# Patient Record
Sex: MALE | Race: BLACK OR AFRICAN AMERICAN | Employment: UNEMPLOYED | URBAN - METROPOLITAN AREA
[De-identification: names, ages, dates, MRNs, and addresses within clinical notes are randomized per-mention and may not be internally consistent; named-entity substitution may affect disease eponyms.]

---

## 2017-05-22 PROBLEM — N18.30 CKD (CHRONIC KIDNEY DISEASE) STAGE 3, GFR 30-59 ML/MIN (HCC): Chronic | Status: ACTIVE | Noted: 2017-05-22

## 2017-05-22 PROBLEM — I48.0 PAROXYSMAL ATRIAL FIBRILLATION (HCC): Chronic | Status: ACTIVE | Noted: 2017-05-22

## 2017-05-22 PROBLEM — N17.9 ACUTE KIDNEY INJURY (HCC): Status: ACTIVE | Noted: 2017-05-22

## 2017-05-22 PROBLEM — E87.0 HYPERNATREMIA: Status: ACTIVE | Noted: 2017-05-22

## 2017-11-08 PROBLEM — N17.9 ACUTE RENAL FAILURE SUPERIMPOSED ON CHRONIC KIDNEY DISEASE, ON CHRONIC DIALYSIS (HCC): Chronic | Status: ACTIVE | Noted: 2017-11-08

## 2017-11-08 PROBLEM — N18.6 END STAGE RENAL DISEASE (HCC): Chronic | Status: ACTIVE | Noted: 2017-11-08

## 2017-11-08 PROBLEM — Z99.2 ACUTE RENAL FAILURE SUPERIMPOSED ON CHRONIC KIDNEY DISEASE, ON CHRONIC DIALYSIS (HCC): Chronic | Status: ACTIVE | Noted: 2017-11-08

## 2017-11-08 PROBLEM — N18.9 ACUTE RENAL FAILURE SUPERIMPOSED ON CHRONIC KIDNEY DISEASE, ON CHRONIC DIALYSIS (HCC): Chronic | Status: ACTIVE | Noted: 2017-11-08

## 2017-11-27 PROBLEM — N18.6 END STAGE RENAL DISEASE (HCC): Chronic | Status: ACTIVE | Noted: 2017-11-27

## 2018-01-18 PROBLEM — N18.6 END STAGE RENAL DISEASE (HCC): Chronic | Status: ACTIVE | Noted: 2018-01-18

## 2018-01-31 PROBLEM — N18.6 END STAGE RENAL DISEASE (HCC): Chronic | Status: ACTIVE | Noted: 2018-01-31

## 2018-04-12 ENCOUNTER — HOSPITAL ENCOUNTER (OUTPATIENT)
Age: 83
Discharge: HOME | End: 2018-04-14
Payer: MEDICARE

## 2018-04-12 LAB — POTASSIUM SERPL-SCNC: 5.3 MMOL/L (ref 3.5–5)

## 2018-04-12 PROCEDURE — 84132 ASSAY OF SERUM POTASSIUM: CPT

## 2018-04-18 ENCOUNTER — OFFICE VISIT (OUTPATIENT)
Dept: VASCULAR SURGERY | Age: 83
End: 2018-04-18
Payer: MEDICARE

## 2018-04-18 VITALS — SYSTOLIC BLOOD PRESSURE: 115 MMHG | DIASTOLIC BLOOD PRESSURE: 68 MMHG | HEART RATE: 73 BPM

## 2018-04-18 DIAGNOSIS — N18.6 END STAGE RENAL DISEASE (HCC): Chronic | ICD-10-CM

## 2018-04-18 PROCEDURE — 99213 OFFICE O/P EST LOW 20 MIN: CPT | Performed by: SURGERY

## 2018-04-20 ENCOUNTER — TELEPHONE (OUTPATIENT)
Dept: VASCULAR SURGERY | Age: 83
End: 2018-04-20

## 2018-04-23 ENCOUNTER — HOSPITAL ENCOUNTER (OUTPATIENT)
Dept: CARDIAC CATH/INVASIVE PROCEDURES | Age: 83
Discharge: HOME | End: 2018-04-23
Payer: MEDICARE

## 2018-04-23 PROCEDURE — 36901 INTRO CATH DIALYSIS CIRCUIT: CPT | Performed by: SURGERY

## 2018-04-23 PROCEDURE — 2709999900 HC NON-CHARGEABLE SUPPLY

## 2018-04-23 PROCEDURE — 2500000003 HC RX 250 WO HCPCS

## 2018-04-23 PROCEDURE — C1894 INTRO/SHEATH, NON-LASER: HCPCS

## 2018-04-23 RX ORDER — SENNOSIDES 8.6 MG
650 CAPSULE ORAL EVERY 8 HOURS PRN
COMMUNITY

## 2018-07-14 ENCOUNTER — HOSPITAL ENCOUNTER (OUTPATIENT)
Age: 83
Discharge: HOME | End: 2018-07-16
Payer: MEDICARE

## 2018-07-14 LAB — POTASSIUM SERPL-SCNC: 4.3 MMOL/L (ref 3.5–5)

## 2018-07-14 PROCEDURE — 84132 ASSAY OF SERUM POTASSIUM: CPT

## 2018-10-01 ENCOUNTER — HOSPITAL ENCOUNTER (INPATIENT)
Age: 83
LOS: 9 days | Discharge: NURSING, REHAB OR LONG-TERM CARE | DRG: 871 | End: 2018-10-10
Attending: EMERGENCY MEDICINE | Admitting: INTERNAL MEDICINE
Payer: MEDICARE

## 2018-10-01 ENCOUNTER — APPOINTMENT (OUTPATIENT)
Dept: GENERAL RADIOLOGY | Age: 83
DRG: 871 | End: 2018-10-01
Payer: MEDICARE

## 2018-10-01 DIAGNOSIS — N18.6 ESRD ON DIALYSIS (HCC): ICD-10-CM

## 2018-10-01 DIAGNOSIS — Z99.2 ESRD ON DIALYSIS (HCC): ICD-10-CM

## 2018-10-01 DIAGNOSIS — J18.9 HCAP (HEALTHCARE-ASSOCIATED PNEUMONIA): ICD-10-CM

## 2018-10-01 DIAGNOSIS — R65.21 SEPTIC SHOCK (HCC): Primary | ICD-10-CM

## 2018-10-01 DIAGNOSIS — A41.9 SEPTIC SHOCK (HCC): Primary | ICD-10-CM

## 2018-10-01 DIAGNOSIS — R79.1 SUPRATHERAPEUTIC INR: ICD-10-CM

## 2018-10-01 PROBLEM — D68.32 WARFARIN-INDUCED COAGULOPATHY (HCC): Status: ACTIVE | Noted: 2018-10-01

## 2018-10-01 PROBLEM — T45.515A WARFARIN-INDUCED COAGULOPATHY (HCC): Status: ACTIVE | Noted: 2018-10-01

## 2018-10-01 PROBLEM — J18.1 LOBAR PNEUMONIA (HCC): Status: ACTIVE | Noted: 2018-10-01

## 2018-10-01 LAB
ABO/RH: NORMAL
ALBUMIN SERPL-MCNC: 2.7 G/DL (ref 3.5–5.2)
ALBUMIN SERPL-MCNC: 2.7 G/DL (ref 3.5–5.2)
ALP BLD-CCNC: 50 U/L (ref 40–129)
ALP BLD-CCNC: 51 U/L (ref 40–129)
ALT SERPL-CCNC: 5 U/L (ref 0–40)
ALT SERPL-CCNC: 5 U/L (ref 0–40)
ANION GAP SERPL CALCULATED.3IONS-SCNC: 24 MMOL/L (ref 7–16)
ANION GAP SERPL CALCULATED.3IONS-SCNC: 24 MMOL/L (ref 7–16)
ANISOCYTOSIS: ABNORMAL
ANTIBODY SCREEN: NORMAL
APTT: 155.5 SEC (ref 24.5–35.1)
AST SERPL-CCNC: 13 U/L (ref 0–39)
AST SERPL-CCNC: 13 U/L (ref 0–39)
BASOPHILS ABSOLUTE: 0 E9/L (ref 0–0.2)
BASOPHILS RELATIVE PERCENT: 0 % (ref 0–2)
BILIRUB SERPL-MCNC: 1.1 MG/DL (ref 0–1.2)
BILIRUB SERPL-MCNC: 1.3 MG/DL (ref 0–1.2)
BUN BLDV-MCNC: 36 MG/DL (ref 8–23)
BUN BLDV-MCNC: 38 MG/DL (ref 8–23)
CALCIUM SERPL-MCNC: 8.3 MG/DL (ref 8.6–10.2)
CALCIUM SERPL-MCNC: 8.4 MG/DL (ref 8.6–10.2)
CHLORIDE BLD-SCNC: 96 MMOL/L (ref 98–107)
CHLORIDE BLD-SCNC: 96 MMOL/L (ref 98–107)
CO2: 20 MMOL/L (ref 22–29)
CO2: 22 MMOL/L (ref 22–29)
CREAT SERPL-MCNC: 6.6 MG/DL (ref 0.7–1.2)
CREAT SERPL-MCNC: 6.8 MG/DL (ref 0.7–1.2)
EKG ATRIAL RATE: 34 BPM
EKG Q-T INTERVAL: 494 MS
EKG QRS DURATION: 120 MS
EKG QTC CALCULATION (BAZETT): 533 MS
EKG R AXIS: 9 DEGREES
EKG T AXIS: 37 DEGREES
EKG VENTRICULAR RATE: 70 BPM
EOSINOPHILS ABSOLUTE: 0.6 E9/L (ref 0.05–0.5)
EOSINOPHILS RELATIVE PERCENT: 3 % (ref 0–6)
FILM ARRAY ADENOVIRUS: NORMAL
FILM ARRAY BORDETELLA PERTUSSIS: NORMAL
FILM ARRAY CHLAMYDOPHILIA PNEUMONIAE: NORMAL
FILM ARRAY CORONAVIRUS 229E: NORMAL
FILM ARRAY CORONAVIRUS HKU1: NORMAL
FILM ARRAY CORONAVIRUS NL63: NORMAL
FILM ARRAY CORONAVIRUS OC43: NORMAL
FILM ARRAY INFLUENZA A VIRUS 09H1: NORMAL
FILM ARRAY INFLUENZA A VIRUS H1: NORMAL
FILM ARRAY INFLUENZA A VIRUS H3: NORMAL
FILM ARRAY INFLUENZA A VIRUS: NORMAL
FILM ARRAY INFLUENZA B: NORMAL
FILM ARRAY METAPNEUMOVIRUS: NORMAL
FILM ARRAY MYCOPLASMA PNEUMONIAE: NORMAL
FILM ARRAY PARAINFLUENZA VIRUS 1: NORMAL
FILM ARRAY PARAINFLUENZA VIRUS 2: NORMAL
FILM ARRAY PARAINFLUENZA VIRUS 3: NORMAL
FILM ARRAY PARAINFLUENZA VIRUS 4: NORMAL
FILM ARRAY RESPIRATORY SYNCITIAL VIRUS: NORMAL
FILM ARRAY RHINOVIRUS/ENTEROVIRUS: NORMAL
GFR AFRICAN AMERICAN: 10
GFR AFRICAN AMERICAN: 9
GFR NON-AFRICAN AMERICAN: 10 ML/MIN/1.73
GFR NON-AFRICAN AMERICAN: 9 ML/MIN/1.73
GLUCOSE BLD-MCNC: 117 MG/DL (ref 74–109)
GLUCOSE BLD-MCNC: 161 MG/DL (ref 74–109)
HCT VFR BLD CALC: 23.5 % (ref 37–54)
HCT VFR BLD CALC: 24.2 % (ref 37–54)
HEMOGLOBIN: 7.1 G/DL (ref 12.5–16.5)
HEMOGLOBIN: 7.4 G/DL (ref 12.5–16.5)
HYPOCHROMIA: ABNORMAL
INR BLD: 5.4
INR BLD: ABNORMAL
LACTIC ACID: 1.7 MMOL/L (ref 0.5–2.2)
LYMPHOCYTES ABSOLUTE: 1.81 E9/L (ref 1.5–4)
LYMPHOCYTES RELATIVE PERCENT: 9 % (ref 20–42)
MAGNESIUM: 1.9 MG/DL (ref 1.6–2.6)
MAGNESIUM: 2 MG/DL (ref 1.6–2.6)
MCH RBC QN AUTO: 30 PG (ref 26–35)
MCHC RBC AUTO-ENTMCNC: 30.6 % (ref 32–34.5)
MCV RBC AUTO: 98 FL (ref 80–99.9)
METAMYELOCYTES RELATIVE PERCENT: 2 % (ref 0–1)
MONOCYTES ABSOLUTE: 1 E9/L (ref 0.1–0.95)
MONOCYTES RELATIVE PERCENT: 5 % (ref 2–12)
MYELOCYTE PERCENT: 1 % (ref 0–0)
NEUTROPHILS ABSOLUTE: 16.68 E9/L (ref 1.8–7.3)
NEUTROPHILS RELATIVE PERCENT: 80 % (ref 43–80)
OVALOCYTES: ABNORMAL
PDW BLD-RTO: 23.9 FL (ref 11.5–15)
PHOSPHORUS: 5.1 MG/DL (ref 2.5–4.5)
PHOSPHORUS: 5.2 MG/DL (ref 2.5–4.5)
PLATELET # BLD: 373 E9/L (ref 130–450)
PMV BLD AUTO: 11.5 FL (ref 7–12)
POIKILOCYTES: ABNORMAL
POLYCHROMASIA: ABNORMAL
POTASSIUM SERPL-SCNC: 3.5 MMOL/L (ref 3.5–5)
POTASSIUM SERPL-SCNC: 3.8 MMOL/L (ref 3.5–5)
PRO-BNP: ABNORMAL PG/ML (ref 0–450)
PROCALCITONIN: 2.88 NG/ML (ref 0–0.08)
PROTHROMBIN TIME: 60.7 SEC (ref 9.3–12.4)
PROTHROMBIN TIME: >120 SEC (ref 9.3–12.4)
RBC # BLD: 2.47 E12/L (ref 3.8–5.8)
SODIUM BLD-SCNC: 140 MMOL/L (ref 132–146)
SODIUM BLD-SCNC: 142 MMOL/L (ref 132–146)
TEAR DROP CELLS: ABNORMAL
TOTAL PROTEIN: 6.5 G/DL (ref 6.4–8.3)
TOTAL PROTEIN: 6.5 G/DL (ref 6.4–8.3)
TROPONIN: 0.5 NG/ML (ref 0–0.03)
TROPONIN: 0.56 NG/ML (ref 0–0.03)
WBC # BLD: 20.1 E9/L (ref 4.5–11.5)

## 2018-10-01 PROCEDURE — 2000000000 HC ICU R&B

## 2018-10-01 PROCEDURE — 84100 ASSAY OF PHOSPHORUS: CPT

## 2018-10-01 PROCEDURE — 71045 X-RAY EXAM CHEST 1 VIEW: CPT

## 2018-10-01 PROCEDURE — 87798 DETECT AGENT NOS DNA AMP: CPT

## 2018-10-01 PROCEDURE — 85018 HEMOGLOBIN: CPT

## 2018-10-01 PROCEDURE — 80053 COMPREHEN METABOLIC PANEL: CPT

## 2018-10-01 PROCEDURE — 83735 ASSAY OF MAGNESIUM: CPT

## 2018-10-01 PROCEDURE — 86901 BLOOD TYPING SEROLOGIC RH(D): CPT

## 2018-10-01 PROCEDURE — 85014 HEMATOCRIT: CPT

## 2018-10-01 PROCEDURE — 36569 INSJ PICC 5 YR+ W/O IMAGING: CPT

## 2018-10-01 PROCEDURE — 87040 BLOOD CULTURE FOR BACTERIA: CPT

## 2018-10-01 PROCEDURE — 87581 M.PNEUMON DNA AMP PROBE: CPT

## 2018-10-01 PROCEDURE — 6360000002 HC RX W HCPCS: Performed by: EMERGENCY MEDICINE

## 2018-10-01 PROCEDURE — 87633 RESP VIRUS 12-25 TARGETS: CPT

## 2018-10-01 PROCEDURE — 99291 CRITICAL CARE FIRST HOUR: CPT

## 2018-10-01 PROCEDURE — 83880 ASSAY OF NATRIURETIC PEPTIDE: CPT

## 2018-10-01 PROCEDURE — 2580000003 HC RX 258: Performed by: INTERNAL MEDICINE

## 2018-10-01 PROCEDURE — 2580000003 HC RX 258: Performed by: EMERGENCY MEDICINE

## 2018-10-01 PROCEDURE — 36592 COLLECT BLOOD FROM PICC: CPT

## 2018-10-01 PROCEDURE — 6370000000 HC RX 637 (ALT 250 FOR IP): Performed by: INTERNAL MEDICINE

## 2018-10-01 PROCEDURE — 36556 INSERT NON-TUNNEL CV CATH: CPT

## 2018-10-01 PROCEDURE — 96365 THER/PROPH/DIAG IV INF INIT: CPT

## 2018-10-01 PROCEDURE — 93005 ELECTROCARDIOGRAM TRACING: CPT | Performed by: EMERGENCY MEDICINE

## 2018-10-01 PROCEDURE — 86900 BLOOD TYPING SEROLOGIC ABO: CPT

## 2018-10-01 PROCEDURE — 83605 ASSAY OF LACTIC ACID: CPT

## 2018-10-01 PROCEDURE — 85730 THROMBOPLASTIN TIME PARTIAL: CPT

## 2018-10-01 PROCEDURE — 36415 COLL VENOUS BLD VENIPUNCTURE: CPT

## 2018-10-01 PROCEDURE — 85610 PROTHROMBIN TIME: CPT

## 2018-10-01 PROCEDURE — 87486 CHLMYD PNEUM DNA AMP PROBE: CPT

## 2018-10-01 PROCEDURE — 51701 INSERT BLADDER CATHETER: CPT

## 2018-10-01 PROCEDURE — 85025 COMPLETE CBC W/AUTO DIFF WBC: CPT

## 2018-10-01 PROCEDURE — 86850 RBC ANTIBODY SCREEN: CPT

## 2018-10-01 PROCEDURE — 2500000003 HC RX 250 WO HCPCS: Performed by: EMERGENCY MEDICINE

## 2018-10-01 PROCEDURE — 93005 ELECTROCARDIOGRAM TRACING: CPT | Performed by: INTERNAL MEDICINE

## 2018-10-01 PROCEDURE — 96368 THER/DIAG CONCURRENT INF: CPT

## 2018-10-01 PROCEDURE — 94640 AIRWAY INHALATION TREATMENT: CPT

## 2018-10-01 PROCEDURE — 06HM33Z INSERTION OF INFUSION DEVICE INTO RIGHT FEMORAL VEIN, PERCUTANEOUS APPROACH: ICD-10-PCS | Performed by: EMERGENCY MEDICINE

## 2018-10-01 PROCEDURE — 86920 COMPATIBILITY TEST SPIN: CPT

## 2018-10-01 PROCEDURE — 84484 ASSAY OF TROPONIN QUANT: CPT

## 2018-10-01 PROCEDURE — 84145 PROCALCITONIN (PCT): CPT

## 2018-10-01 RX ORDER — ACETAMINOPHEN 325 MG/1
325 TABLET ORAL EVERY 4 HOURS PRN
Status: DISCONTINUED | OUTPATIENT
Start: 2018-10-01 | End: 2018-10-10 | Stop reason: HOSPADM

## 2018-10-01 RX ORDER — ATORVASTATIN CALCIUM 20 MG/1
20 TABLET, FILM COATED ORAL DAILY
Status: DISCONTINUED | OUTPATIENT
Start: 2018-10-01 | End: 2018-10-10 | Stop reason: HOSPADM

## 2018-10-01 RX ORDER — 0.9 % SODIUM CHLORIDE 0.9 %
500 INTRAVENOUS SOLUTION INTRAVENOUS ONCE
Status: COMPLETED | OUTPATIENT
Start: 2018-10-01 | End: 2018-10-01

## 2018-10-01 RX ORDER — MIDODRINE HYDROCHLORIDE 5 MG/1
5 TABLET ORAL 3 TIMES DAILY
Status: DISCONTINUED | OUTPATIENT
Start: 2018-10-01 | End: 2018-10-02

## 2018-10-01 RX ORDER — 0.9 % SODIUM CHLORIDE 0.9 %
30 INTRAVENOUS SOLUTION INTRAVENOUS ONCE
Status: COMPLETED | OUTPATIENT
Start: 2018-10-01 | End: 2018-10-01

## 2018-10-01 RX ORDER — ONDANSETRON 2 MG/ML
4 INJECTION INTRAMUSCULAR; INTRAVENOUS EVERY 6 HOURS PRN
Status: DISCONTINUED | OUTPATIENT
Start: 2018-10-01 | End: 2018-10-10 | Stop reason: HOSPADM

## 2018-10-01 RX ORDER — LEVOTHYROXINE SODIUM 0.05 MG/1
50 TABLET ORAL DAILY
Status: DISCONTINUED | OUTPATIENT
Start: 2018-10-02 | End: 2018-10-10 | Stop reason: HOSPADM

## 2018-10-01 RX ORDER — WARFARIN SODIUM 2.5 MG/1
2.5 TABLET ORAL DAILY
Status: ON HOLD | COMMUNITY
End: 2018-10-10 | Stop reason: HOSPADM

## 2018-10-01 RX ORDER — SKIN PROTECTANT 44 G/100G
OINTMENT TOPICAL 2 TIMES DAILY
COMMUNITY
End: 2018-12-10 | Stop reason: ALTCHOICE

## 2018-10-01 RX ORDER — SODIUM CHLORIDE 0.9 % (FLUSH) 0.9 %
10 SYRINGE (ML) INJECTION PRN
Status: DISCONTINUED | OUTPATIENT
Start: 2018-10-01 | End: 2018-10-10 | Stop reason: HOSPADM

## 2018-10-01 RX ORDER — ALBUTEROL SULFATE 2.5 MG/3ML
2.5 SOLUTION RESPIRATORY (INHALATION) EVERY 6 HOURS PRN
COMMUNITY

## 2018-10-01 RX ORDER — SODIUM CHLORIDE 0.9 % (FLUSH) 0.9 %
10 SYRINGE (ML) INJECTION EVERY 12 HOURS SCHEDULED
Status: DISCONTINUED | OUTPATIENT
Start: 2018-10-01 | End: 2018-10-10 | Stop reason: HOSPADM

## 2018-10-01 RX ORDER — IPRATROPIUM BROMIDE AND ALBUTEROL SULFATE 2.5; .5 MG/3ML; MG/3ML
1 SOLUTION RESPIRATORY (INHALATION) EVERY 4 HOURS
Status: DISCONTINUED | OUTPATIENT
Start: 2018-10-01 | End: 2018-10-10 | Stop reason: HOSPADM

## 2018-10-01 RX ORDER — SODIUM CHLORIDE 9 MG/ML
INJECTION, SOLUTION INTRAVENOUS CONTINUOUS
Status: DISCONTINUED | OUTPATIENT
Start: 2018-10-01 | End: 2018-10-01

## 2018-10-01 RX ADMIN — IPRATROPIUM BROMIDE AND ALBUTEROL SULFATE 1 AMPULE: 2.5; .5 SOLUTION RESPIRATORY (INHALATION) at 20:43

## 2018-10-01 RX ADMIN — NOREPINEPHRINE BITARTRATE 2 MCG/MIN: 1 INJECTION INTRAVENOUS at 13:26

## 2018-10-01 RX ADMIN — VANCOMYCIN HYDROCHLORIDE 1.5 G: 10 INJECTION, POWDER, LYOPHILIZED, FOR SOLUTION INTRAVENOUS at 15:00

## 2018-10-01 RX ADMIN — ATORVASTATIN CALCIUM 20 MG: 20 TABLET, FILM COATED ORAL at 21:30

## 2018-10-01 RX ADMIN — PHYTONADIONE 10 MG: 10 INJECTION, EMULSION INTRAMUSCULAR; INTRAVENOUS; SUBCUTANEOUS at 15:20

## 2018-10-01 RX ADMIN — CEFEPIME HYDROCHLORIDE 2 G: 2 INJECTION, POWDER, FOR SOLUTION INTRAVENOUS at 13:12

## 2018-10-01 RX ADMIN — SODIUM CHLORIDE 2382 ML: 9 INJECTION, SOLUTION INTRAVENOUS at 13:02

## 2018-10-01 RX ADMIN — Medication 10 ML: at 21:28

## 2018-10-01 RX ADMIN — SODIUM CHLORIDE 500 ML: 9 INJECTION, SOLUTION INTRAVENOUS at 11:14

## 2018-10-01 RX ADMIN — IPRATROPIUM BROMIDE AND ALBUTEROL SULFATE 1 AMPULE: 2.5; .5 SOLUTION RESPIRATORY (INHALATION) at 17:21

## 2018-10-01 RX ADMIN — SODIUM CHLORIDE: 9 INJECTION, SOLUTION INTRAVENOUS at 16:31

## 2018-10-01 ASSESSMENT — PAIN DESCRIPTION - DESCRIPTORS: DESCRIPTORS: ACHING

## 2018-10-01 ASSESSMENT — PAIN DESCRIPTION - LOCATION: LOCATION: GENERALIZED

## 2018-10-01 ASSESSMENT — PAIN DESCRIPTION - PAIN TYPE: TYPE: ACUTE PAIN

## 2018-10-01 ASSESSMENT — PAIN SCALES - GENERAL: PAINLEVEL_OUTOF10: 10

## 2018-10-01 NOTE — CONSULTS
Laterality Date    CARDIAC CATHETERIZATION  2/26/13    DIALYSIS FISTULA CREATION Left 12/29/2017    Left arm AV fistula creation/Dr. Mendoza    ECHO COMPL W DOP COLOR FLOW  2/24/2013         OTHER SURGICAL HISTORY  04/23/2018    Left arm fistula by DR Mendoza    UPPER GASTROINTESTINAL ENDOSCOPY  4/14, 4/14/16, & 4/24/16    VENA CAVA FILTER PLACEMENT  8/14    d/t DVT       Medications Prior to Admission:    Prior to Admission medications    Medication Sig Start Date End Date Taking?  Authorizing Provider   albuterol (PROVENTIL) (2.5 MG/3ML) 0.083% nebulizer solution Take 2.5 mg by nebulization every 6 hours as needed for Shortness of Breath   Yes Historical Provider, MD   [facility]) OINT ointment Apply topically 2 times daily BLE   Yes Historical Provider, MD   warfarin (COUMADIN) 2.5 MG tablet Take 2.5 mg by mouth daily   Yes Historical Provider, MD   acetaminophen (ARTHRITIS PAIN RELIEF) 650 MG extended release tablet Take 650 mg by mouth every 8 hours as needed for Pain   Yes Historical Provider, MD   benzocaine (CEPACOL) 10 MG LOZG Take 1 lozenge by mouth every 4 hours as needed    Yes Historical Provider, MD   magnesium hydroxide (MILK OF MAGNESIA) 400 MG/5ML suspension Take 30 mLs by mouth daily as needed for Constipation    Yes Historical Provider, MD   allopurinol (ZYLOPRIM) 100 MG tablet Take 100 mg by mouth daily   Yes Historical Provider, MD   atorvastatin (LIPITOR) 20 MG tablet Take 20 mg by mouth daily   Yes Historical Provider, MD   loperamide (IMODIUM) 2 MG capsule Take 2 mg by mouth every 12 hours as needed for Diarrhea    Yes Historical Provider, MD   ondansetron (ZOFRAN) 4 MG tablet Take 4 mg by mouth every 6 hours as needed for Nausea or Vomiting   Yes Historical Provider, MD   aspirin 81 MG tablet Take 81 mg by mouth daily   Yes Historical Provider, MD   midodrine (PROAMATINE) 5 MG tablet Take 5 mg by mouth 3 times daily   Yes Historical Provider, MD   levothyroxine (SYNTHROID)

## 2018-10-02 ENCOUNTER — APPOINTMENT (OUTPATIENT)
Dept: GENERAL RADIOLOGY | Age: 83
DRG: 871 | End: 2018-10-02
Payer: MEDICARE

## 2018-10-02 LAB
ALBUMIN SERPL-MCNC: 2.8 G/DL (ref 3.5–5.2)
ALP BLD-CCNC: 48 U/L (ref 40–129)
ALT SERPL-CCNC: <5 U/L (ref 0–40)
ANION GAP SERPL CALCULATED.3IONS-SCNC: 23 MMOL/L (ref 7–16)
ANISOCYTOSIS: ABNORMAL
AST SERPL-CCNC: 13 U/L (ref 0–39)
BASOPHILIC STIPPLING: ABNORMAL
BASOPHILS ABSOLUTE: 0.08 E9/L (ref 0–0.2)
BASOPHILS RELATIVE PERCENT: 0.4 % (ref 0–2)
BILIRUB SERPL-MCNC: 1.1 MG/DL (ref 0–1.2)
BLOOD BANK DISPENSE STATUS: NORMAL
BLOOD BANK PRODUCT CODE: NORMAL
BPU ID: NORMAL
BUN BLDV-MCNC: 44 MG/DL (ref 8–23)
CALCIUM SERPL-MCNC: 8.2 MG/DL (ref 8.6–10.2)
CHLORIDE BLD-SCNC: 95 MMOL/L (ref 98–107)
CO2: 22 MMOL/L (ref 22–29)
CREAT SERPL-MCNC: 7.5 MG/DL (ref 0.7–1.2)
DESCRIPTION BLOOD BANK: NORMAL
EKG ATRIAL RATE: 81 BPM
EKG Q-T INTERVAL: 448 MS
EKG QRS DURATION: 122 MS
EKG QTC CALCULATION (BAZETT): 533 MS
EKG R AXIS: 8 DEGREES
EKG T AXIS: 60 DEGREES
EKG VENTRICULAR RATE: 85 BPM
EOSINOPHILS ABSOLUTE: 0.32 E9/L (ref 0.05–0.5)
EOSINOPHILS RELATIVE PERCENT: 1.6 % (ref 0–6)
GFR AFRICAN AMERICAN: 8
GFR NON-AFRICAN AMERICAN: 8 ML/MIN/1.73
GLUCOSE BLD-MCNC: 139 MG/DL (ref 74–109)
HCT VFR BLD CALC: 22.2 % (ref 37–54)
HCT VFR BLD CALC: 25.3 % (ref 37–54)
HCT VFR BLD CALC: 26.1 % (ref 37–54)
HEMOGLOBIN: 6.8 G/DL (ref 12.5–16.5)
HEMOGLOBIN: 7.8 G/DL (ref 12.5–16.5)
HEMOGLOBIN: 8.1 G/DL (ref 12.5–16.5)
HYPOCHROMIA: ABNORMAL
IMMATURE GRANULOCYTES #: 1.43 E9/L
IMMATURE GRANULOCYTES %: 7 % (ref 0–5)
INR BLD: 1.5
LYMPHOCYTES ABSOLUTE: 2.1 E9/L (ref 1.5–4)
LYMPHOCYTES RELATIVE PERCENT: 10.3 % (ref 20–42)
MAGNESIUM: 2 MG/DL (ref 1.6–2.6)
MCH RBC QN AUTO: 29.8 PG (ref 26–35)
MCHC RBC AUTO-ENTMCNC: 30.6 % (ref 32–34.5)
MCV RBC AUTO: 97.4 FL (ref 80–99.9)
MONOCYTES ABSOLUTE: 1.44 E9/L (ref 0.1–0.95)
MONOCYTES RELATIVE PERCENT: 7.1 % (ref 2–12)
NEUTROPHILS ABSOLUTE: 14.99 E9/L (ref 1.8–7.3)
NEUTROPHILS RELATIVE PERCENT: 73.6 % (ref 43–80)
OVALOCYTES: ABNORMAL
PDW BLD-RTO: 24.8 FL (ref 11.5–15)
PHOSPHORUS: 6.2 MG/DL (ref 2.5–4.5)
PLATELET # BLD: 379 E9/L (ref 130–450)
PMV BLD AUTO: 11.2 FL (ref 7–12)
POIKILOCYTES: ABNORMAL
POLYCHROMASIA: ABNORMAL
POTASSIUM SERPL-SCNC: 3.6 MMOL/L (ref 3.5–5)
PROTHROMBIN TIME: 17.7 SEC (ref 9.3–12.4)
RBC # BLD: 2.28 E12/L (ref 3.8–5.8)
SODIUM BLD-SCNC: 140 MMOL/L (ref 132–146)
TEAR DROP CELLS: ABNORMAL
TOTAL PROTEIN: 6.1 G/DL (ref 6.4–8.3)
WBC # BLD: 20.4 E9/L (ref 4.5–11.5)

## 2018-10-02 PROCEDURE — 36430 TRANSFUSION BLD/BLD COMPNT: CPT

## 2018-10-02 PROCEDURE — 85025 COMPLETE CBC W/AUTO DIFF WBC: CPT

## 2018-10-02 PROCEDURE — 80053 COMPREHEN METABOLIC PANEL: CPT

## 2018-10-02 PROCEDURE — 2000000000 HC ICU R&B

## 2018-10-02 PROCEDURE — 85018 HEMOGLOBIN: CPT

## 2018-10-02 PROCEDURE — 6360000002 HC RX W HCPCS: Performed by: STUDENT IN AN ORGANIZED HEALTH CARE EDUCATION/TRAINING PROGRAM

## 2018-10-02 PROCEDURE — 36592 COLLECT BLOOD FROM PICC: CPT

## 2018-10-02 PROCEDURE — 6360000002 HC RX W HCPCS: Performed by: INTERNAL MEDICINE

## 2018-10-02 PROCEDURE — 84100 ASSAY OF PHOSPHORUS: CPT

## 2018-10-02 PROCEDURE — 36415 COLL VENOUS BLD VENIPUNCTURE: CPT

## 2018-10-02 PROCEDURE — 6370000000 HC RX 637 (ALT 250 FOR IP): Performed by: INTERNAL MEDICINE

## 2018-10-02 PROCEDURE — 2500000003 HC RX 250 WO HCPCS

## 2018-10-02 PROCEDURE — P9016 RBC LEUKOCYTES REDUCED: HCPCS

## 2018-10-02 PROCEDURE — 36620 INSERTION CATHETER ARTERY: CPT

## 2018-10-02 PROCEDURE — 2500000003 HC RX 250 WO HCPCS: Performed by: EMERGENCY MEDICINE

## 2018-10-02 PROCEDURE — 93005 ELECTROCARDIOGRAM TRACING: CPT | Performed by: INTERNAL MEDICINE

## 2018-10-02 PROCEDURE — 2580000003 HC RX 258: Performed by: STUDENT IN AN ORGANIZED HEALTH CARE EDUCATION/TRAINING PROGRAM

## 2018-10-02 PROCEDURE — 83735 ASSAY OF MAGNESIUM: CPT

## 2018-10-02 PROCEDURE — 71045 X-RAY EXAM CHEST 1 VIEW: CPT

## 2018-10-02 PROCEDURE — 2580000003 HC RX 258: Performed by: INTERNAL MEDICINE

## 2018-10-02 PROCEDURE — 85610 PROTHROMBIN TIME: CPT

## 2018-10-02 PROCEDURE — 85014 HEMATOCRIT: CPT

## 2018-10-02 PROCEDURE — 94640 AIRWAY INHALATION TREATMENT: CPT

## 2018-10-02 PROCEDURE — 2700000000 HC OXYGEN THERAPY PER DAY

## 2018-10-02 PROCEDURE — 93010 ELECTROCARDIOGRAM REPORT: CPT | Performed by: INTERNAL MEDICINE

## 2018-10-02 RX ORDER — 0.9 % SODIUM CHLORIDE 0.9 %
250 INTRAVENOUS SOLUTION INTRAVENOUS ONCE
Status: COMPLETED | OUTPATIENT
Start: 2018-10-02 | End: 2018-10-02

## 2018-10-02 RX ORDER — MIDODRINE HYDROCHLORIDE 5 MG/1
10 TABLET ORAL 3 TIMES DAILY
Status: DISCONTINUED | OUTPATIENT
Start: 2018-10-02 | End: 2018-10-10 | Stop reason: HOSPADM

## 2018-10-02 RX ORDER — LIDOCAINE HYDROCHLORIDE 10 MG/ML
INJECTION, SOLUTION EPIDURAL; INFILTRATION; INTRACAUDAL; PERINEURAL
Status: COMPLETED
Start: 2018-10-02 | End: 2018-10-02

## 2018-10-02 RX ADMIN — IPRATROPIUM BROMIDE AND ALBUTEROL SULFATE 1 AMPULE: 2.5; .5 SOLUTION RESPIRATORY (INHALATION) at 21:05

## 2018-10-02 RX ADMIN — LIDOCAINE HYDROCHLORIDE 5 ML: 10 INJECTION, SOLUTION EPIDURAL; INFILTRATION; INTRACAUDAL; PERINEURAL at 21:52

## 2018-10-02 RX ADMIN — PHENYLEPHRINE HYDROCHLORIDE 185 MCG/MIN: 10 INJECTION INTRAVENOUS at 20:59

## 2018-10-02 RX ADMIN — PHENYLEPHRINE HYDROCHLORIDE 1 MCG/KG/MIN: 10 INJECTION INTRAVENOUS at 13:51

## 2018-10-02 RX ADMIN — SODIUM CHLORIDE 250 ML: 9 INJECTION, SOLUTION INTRAVENOUS at 09:25

## 2018-10-02 RX ADMIN — MIDODRINE HYDROCHLORIDE 10 MG: 5 TABLET ORAL at 18:34

## 2018-10-02 RX ADMIN — IPRATROPIUM BROMIDE AND ALBUTEROL SULFATE 1 AMPULE: 2.5; .5 SOLUTION RESPIRATORY (INHALATION) at 10:31

## 2018-10-02 RX ADMIN — Medication 10 ML: at 09:30

## 2018-10-02 RX ADMIN — ATORVASTATIN CALCIUM 20 MG: 20 TABLET, FILM COATED ORAL at 20:16

## 2018-10-02 RX ADMIN — Medication 8 MCG/MIN: at 08:59

## 2018-10-02 RX ADMIN — IPRATROPIUM BROMIDE AND ALBUTEROL SULFATE 1 AMPULE: 2.5; .5 SOLUTION RESPIRATORY (INHALATION) at 16:46

## 2018-10-02 RX ADMIN — PHENYLEPHRINE HYDROCHLORIDE 140 MCG/MIN: 10 INJECTION INTRAVENOUS at 15:45

## 2018-10-02 RX ADMIN — CEFEPIME HYDROCHLORIDE 1 G: 1 INJECTION, POWDER, FOR SOLUTION INTRAMUSCULAR; INTRAVENOUS at 13:13

## 2018-10-02 RX ADMIN — IPRATROPIUM BROMIDE AND ALBUTEROL SULFATE 1 AMPULE: 2.5; .5 SOLUTION RESPIRATORY (INHALATION) at 12:36

## 2018-10-02 RX ADMIN — Medication 10 ML: at 20:16

## 2018-10-02 RX ADMIN — MIDODRINE HYDROCHLORIDE 5 MG: 5 TABLET ORAL at 08:55

## 2018-10-02 RX ADMIN — PHENYLEPHRINE HYDROCHLORIDE 250 MCG/MIN: 10 INJECTION INTRAVENOUS at 23:56

## 2018-10-02 RX ADMIN — MIDODRINE HYDROCHLORIDE 5 MG: 5 TABLET ORAL at 13:31

## 2018-10-02 RX ADMIN — LEVOTHYROXINE SODIUM 50 MCG: 50 TABLET ORAL at 08:55

## 2018-10-02 ASSESSMENT — PAIN SCALES - GENERAL: PAINLEVEL_OUTOF10: 0

## 2018-10-02 NOTE — CONSULTS
[facility]  Consult Note        Date:   10/2/2018  Patient name:  Stephen Echeverria  Date of admission:  10/1/2018 10:23 AM  MRN:   [id number]  YOB: 1934    Reason for Consult:  HCAP    Consulting Physician:  Dr. Sherry Castañeda    CC:   Chief Complaint   Patient presents with    Dizziness     low BP at ECF/ 77/45 upon arrival       HISTORY OF PRESENT ILLNESS:                The patient is a 80 y.o.  male who was admitted to the hospital for AMS from NH. He had fever, productive cough with white sputum for 2 days, was put on levaquin (time and dose unknown). He also had A fib RVR on warfarin with a prolonged INR>10 on admission. Reports oozing from dialysis access site (Left arm AV fistula). Hb dropped to 6.8 and is receiving blood transfusion this morning. His procalcitonin 2.88, Pro-BNP 33,401, leukocytosis with WBC of 20.1. He received one dose of vancomycin and was on cefepime 1g Q24h. No fever, chest pain or diarrhea. Past Medical History:   has a past medical history of Anemia; Arthritis; Atrial fibrillation ([address] Calcaneal spur of both feet; Cardiomyopathy; Cardiomyopathy ([address] CHF (congestive heart failure) ([address] Chronic kidney disease; Combined systolic and diastolic heart failure ([address] Depression; Dialysis patient; DVT (deep venous thrombosis) ([address] Dysphagia; Failure to thrive (0-17); Gastrointestinal hemorrhage; Gout; Hemodialysis patient ([address] Hyperlipidemia; Hypertension; Hypotension of hemodialysis; Lobar pneumonia ([address] Peripheral vascular disease; Pulmonary hypertension ([address] 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 Past Surgical History:   has a past surgical history that includes ECHO Compl W Dop Color Flow (2/24/2013); Cardiac catheterization (2/26/13); Vena Cava Filter Placement (8/14); Upper gastrointestinal endoscopy (4/14, 4/14/16, & 4/24/16);  Dialysis fistula creation (Left, 12/29/2017); and other surgical history (04/23/2018). Home Medications:    Prior to Admission medications    Medication Sig Start Date End Date Taking? Authorizing Provider   albuterol (PROVENTIL) (2.5 MG/3ML) 0.083% nebulizer solution Take 2.5 mg by nebulization every 6 hours as needed for Shortness of Breath   Yes Historical Provider, MD   [facility] OINT ointment Apply topically 2 times daily BLE   Yes Historical Provider, MD   warfarin (COUMADIN) 2.5 MG tablet Take 2.5 mg by mouth daily   Yes Historical Provider, MD   acetaminophen (ARTHRITIS PAIN RELIEF) 650 MG extended release tablet Take 650 mg by mouth every 8 hours as needed for Pain   Yes Historical Provider, MD   benzocaine (CEPACOL) 10 MG LOZG Take 1 lozenge by mouth every 4 hours as needed    Yes Historical Provider, MD   magnesium hydroxide (MILK OF MAGNESIA) 400 MG/5ML suspension Take 30 mLs by mouth daily as needed for Constipation    Yes Historical Provider, MD   allopurinol (ZYLOPRIM) 100 MG tablet Take 100 mg by mouth daily   Yes Historical Provider, MD   atorvastatin (LIPITOR) 20 MG tablet Take 20 mg by mouth daily   Yes Historical Provider, MD   loperamide (IMODIUM) 2 MG capsule Take 2 mg by mouth every 12 hours as needed for Diarrhea    Yes Historical Provider, MD   ondansetron (ZOFRAN) 4 MG tablet Take 4 mg by mouth every 6 hours as needed for Nausea or Vomiting   Yes Historical Provider, MD   aspirin 81 MG tablet Take 81 mg by mouth daily   Yes Historical Provider, MD   midodrine (PROAMATINE) 5 MG tablet Take 5 mg by mouth 3 times daily   Yes Historical Provider, MD   levothyroxine (SYNTHROID) 50 MCG tablet Take 50 mcg by mouth Daily   Yes Historical Provider, MD   zinc oxide 20 % ointment Apply topically as needed for Dry Skin Apply topically as needed. Yes Historical Provider, MD       Allergies:  Patient has no known allergies. Social History:   reports that he has never smoked.  He has never used smokeless tobacco. He reports that he

## 2018-10-02 NOTE — CARE COORDINATION
Social Work/Discharge Planning:    Pt is from [facility]. TRAVIS spoke with Db Yun at [facility]. Db Yun stated that pt is intermediate care, a bed hold, able to return and will not need precert at discharge. Db Yun stated that pt will need PT/OT evals prior to d/c. TRAVIS spoke with pt brother Pricila, pt bother Pricila stated that he is pt POA and plan at discharge is for pt to return to [facility]. N-17 started, Ambulance form and envelope in pt soft chart. SW will follow.     Electronically signed by ERNESTO Jones on 10/2/2018 at 11:16 AM

## 2018-10-02 NOTE — PROGRESS NOTES
neosynephrine    Supra-therapeutic INR  - improving  - likely 2/2 drug interaction between Levaquin and Warfarin  - pt presented with INR >10 and left arm AVF bleeding  - INR today down to 1.5  - bleeding stopped  - continue to monitor INR     Pulmonary  HCAP  - hx of fever, cough, sob  - leukocytosis: 20.4 today , procalcitonin 2.88  - CXR showing improvements in overall appearance  - ID following, pt to continue on Cefepime  - so far, respiratory panel negative  - obtain outstanding cultures    Renal  ESRD on HD T-T-S  - last dialysis session was Saturday 9/29/18  - nephrology following, will hold off on dialysis today due to hypotensive episodes  - BUN/cr 44/7.5, phosphorus 6.2, calcium 8.2  - for dialysis tomorrow    Infectious  Septic shock  - likely 2/2 PNA  - currently requiring pressor support of phenylephrine, MAP 71  - continue on IVFs NS 10 cc/hr  - ID following and pt to continue on Cefepime  - wean off pressors as tolerated  - monitor vitals    Hem/Onc  Acute on chronic anemia  - likely 2/2 blood loss from IVF  - Hb dropped to 6.8  - received 1 unit PRBC, post transfusion Hb 8.1  - monitor daily cbcs and transfuse if hb<7    Disposition: ALEXANDER Quiros M.D. Internal Medicine Resident - PGY1    Attending Physician: Dr. Klaudia Chatman      I personally saw, examined, and cared for the patient. Labs, medications, radiographs reviewed. I agree with history exam and plans detailed in NP note.   Camila Francis D.O.  CCT 42 min

## 2018-10-03 LAB
ALBUMIN SERPL-MCNC: 2.7 G/DL (ref 3.5–5.2)
ALP BLD-CCNC: 49 U/L (ref 40–129)
ALT SERPL-CCNC: 5 U/L (ref 0–40)
ANION GAP SERPL CALCULATED.3IONS-SCNC: 24 MMOL/L (ref 7–16)
ANISOCYTOSIS: ABNORMAL
AST SERPL-CCNC: 13 U/L (ref 0–39)
BASOPHILIC STIPPLING: ABNORMAL
BASOPHILS ABSOLUTE: 0 E9/L (ref 0–0.2)
BASOPHILS RELATIVE PERCENT: 0.5 % (ref 0–2)
BILIRUB SERPL-MCNC: 0.8 MG/DL (ref 0–1.2)
BUN BLDV-MCNC: 49 MG/DL (ref 8–23)
CALCIUM SERPL-MCNC: 8.1 MG/DL (ref 8.6–10.2)
CHLORIDE BLD-SCNC: 95 MMOL/L (ref 98–107)
CO2: 20 MMOL/L (ref 22–29)
CREAT SERPL-MCNC: 8.8 MG/DL (ref 0.7–1.2)
EOSINOPHILS ABSOLUTE: 0.45 E9/L (ref 0.05–0.5)
EOSINOPHILS RELATIVE PERCENT: 1.8 % (ref 0–6)
GFR AFRICAN AMERICAN: 7
GFR NON-AFRICAN AMERICAN: 7 ML/MIN/1.73
GLUCOSE BLD-MCNC: 140 MG/DL (ref 74–109)
HCT VFR BLD CALC: 24.4 % (ref 37–54)
HCT VFR BLD CALC: 24.5 % (ref 37–54)
HEMOGLOBIN: 7.7 G/DL (ref 12.5–16.5)
HEMOGLOBIN: 7.8 G/DL (ref 12.5–16.5)
INR BLD: 1.7
LV EF: 50 %
LVEF MODALITY: NORMAL
LYMPHOCYTES ABSOLUTE: 3 E9/L (ref 1.5–4)
LYMPHOCYTES RELATIVE PERCENT: 11.5 % (ref 20–42)
MAGNESIUM: 1.9 MG/DL (ref 1.6–2.6)
MCH RBC QN AUTO: 30.1 PG (ref 26–35)
MCHC RBC AUTO-ENTMCNC: 31.8 % (ref 32–34.5)
MCV RBC AUTO: 94.6 FL (ref 80–99.9)
MONOCYTES ABSOLUTE: 1.5 E9/L (ref 0.1–0.95)
MONOCYTES RELATIVE PERCENT: 6.2 % (ref 2–12)
MYELOCYTE PERCENT: 0.9 % (ref 0–0)
NEUTROPHILS ABSOLUTE: 20.25 E9/L (ref 1.8–7.3)
NEUTROPHILS RELATIVE PERCENT: 79.6 % (ref 43–80)
OVALOCYTES: ABNORMAL
PDW BLD-RTO: 23.3 FL (ref 11.5–15)
PHOSPHORUS: 6.5 MG/DL (ref 2.5–4.5)
PLATELET # BLD: 416 E9/L (ref 130–450)
PMV BLD AUTO: 10.9 FL (ref 7–12)
POIKILOCYTES: ABNORMAL
POLYCHROMASIA: ABNORMAL
POTASSIUM SERPL-SCNC: 3.4 MMOL/L (ref 3.5–5)
PROTHROMBIN TIME: 19.9 SEC (ref 9.3–12.4)
RBC # BLD: 2.59 E12/L (ref 3.8–5.8)
SCHISTOCYTES: ABNORMAL
SODIUM BLD-SCNC: 139 MMOL/L (ref 132–146)
TOTAL PROTEIN: 6.1 G/DL (ref 6.4–8.3)
WBC # BLD: 25 E9/L (ref 4.5–11.5)

## 2018-10-03 PROCEDURE — 85610 PROTHROMBIN TIME: CPT

## 2018-10-03 PROCEDURE — 84100 ASSAY OF PHOSPHORUS: CPT

## 2018-10-03 PROCEDURE — 2700000000 HC OXYGEN THERAPY PER DAY

## 2018-10-03 PROCEDURE — 6360000002 HC RX W HCPCS: Performed by: STUDENT IN AN ORGANIZED HEALTH CARE EDUCATION/TRAINING PROGRAM

## 2018-10-03 PROCEDURE — 85018 HEMOGLOBIN: CPT

## 2018-10-03 PROCEDURE — 2000000000 HC ICU R&B

## 2018-10-03 PROCEDURE — 6370000000 HC RX 637 (ALT 250 FOR IP): Performed by: INTERNAL MEDICINE

## 2018-10-03 PROCEDURE — 90935 HEMODIALYSIS ONE EVALUATION: CPT | Performed by: INTERNAL MEDICINE

## 2018-10-03 PROCEDURE — 6360000002 HC RX W HCPCS: Performed by: INTERNAL MEDICINE

## 2018-10-03 PROCEDURE — 83735 ASSAY OF MAGNESIUM: CPT

## 2018-10-03 PROCEDURE — 2580000003 HC RX 258: Performed by: INTERNAL MEDICINE

## 2018-10-03 PROCEDURE — 80053 COMPREHEN METABOLIC PANEL: CPT

## 2018-10-03 PROCEDURE — 36415 COLL VENOUS BLD VENIPUNCTURE: CPT

## 2018-10-03 PROCEDURE — 87040 BLOOD CULTURE FOR BACTERIA: CPT

## 2018-10-03 PROCEDURE — 2580000003 HC RX 258: Performed by: STUDENT IN AN ORGANIZED HEALTH CARE EDUCATION/TRAINING PROGRAM

## 2018-10-03 PROCEDURE — 93306 TTE W/DOPPLER COMPLETE: CPT

## 2018-10-03 PROCEDURE — 5A1D70Z PERFORMANCE OF URINARY FILTRATION, INTERMITTENT, LESS THAN 6 HOURS PER DAY: ICD-10-PCS | Performed by: INTERNAL MEDICINE

## 2018-10-03 PROCEDURE — 36592 COLLECT BLOOD FROM PICC: CPT

## 2018-10-03 PROCEDURE — 94640 AIRWAY INHALATION TREATMENT: CPT

## 2018-10-03 PROCEDURE — 99223 1ST HOSP IP/OBS HIGH 75: CPT | Performed by: CLINICAL NURSE SPECIALIST

## 2018-10-03 PROCEDURE — 92610 EVALUATE SWALLOWING FUNCTION: CPT

## 2018-10-03 PROCEDURE — 85025 COMPLETE CBC W/AUTO DIFF WBC: CPT

## 2018-10-03 PROCEDURE — 85014 HEMATOCRIT: CPT

## 2018-10-03 RX ORDER — WARFARIN SODIUM 2.5 MG/1
2.5 TABLET ORAL DAILY
Status: DISCONTINUED | OUTPATIENT
Start: 2018-10-03 | End: 2018-10-04

## 2018-10-03 RX ADMIN — CEFEPIME HYDROCHLORIDE 1 G: 1 INJECTION, POWDER, FOR SOLUTION INTRAMUSCULAR; INTRAVENOUS at 13:02

## 2018-10-03 RX ADMIN — IPRATROPIUM BROMIDE AND ALBUTEROL SULFATE 1 AMPULE: 2.5; .5 SOLUTION RESPIRATORY (INHALATION) at 11:34

## 2018-10-03 RX ADMIN — Medication 10 ML: at 20:12

## 2018-10-03 RX ADMIN — Medication 10 ML: at 13:02

## 2018-10-03 RX ADMIN — PHENYLEPHRINE HYDROCHLORIDE 120 MCG/MIN: 10 INJECTION INTRAVENOUS at 23:03

## 2018-10-03 RX ADMIN — VANCOMYCIN HYDROCHLORIDE 1250 MG: 10 INJECTION, POWDER, LYOPHILIZED, FOR SOLUTION INTRAVENOUS at 21:30

## 2018-10-03 RX ADMIN — PHENYLEPHRINE HYDROCHLORIDE 250 MCG/MIN: 10 INJECTION INTRAVENOUS at 07:10

## 2018-10-03 RX ADMIN — ATORVASTATIN CALCIUM 20 MG: 20 TABLET, FILM COATED ORAL at 20:11

## 2018-10-03 RX ADMIN — MIDODRINE HYDROCHLORIDE 10 MG: 5 TABLET ORAL at 14:21

## 2018-10-03 RX ADMIN — IPRATROPIUM BROMIDE AND ALBUTEROL SULFATE 1 AMPULE: 2.5; .5 SOLUTION RESPIRATORY (INHALATION) at 08:11

## 2018-10-03 RX ADMIN — MIDODRINE HYDROCHLORIDE 10 MG: 5 TABLET ORAL at 08:46

## 2018-10-03 RX ADMIN — LEVOTHYROXINE SODIUM 50 MCG: 50 TABLET ORAL at 05:48

## 2018-10-03 RX ADMIN — IPRATROPIUM BROMIDE AND ALBUTEROL SULFATE 1 AMPULE: 2.5; .5 SOLUTION RESPIRATORY (INHALATION) at 16:25

## 2018-10-03 RX ADMIN — MIDODRINE HYDROCHLORIDE 10 MG: 5 TABLET ORAL at 17:59

## 2018-10-03 RX ADMIN — IPRATROPIUM BROMIDE AND ALBUTEROL SULFATE 1 AMPULE: 2.5; .5 SOLUTION RESPIRATORY (INHALATION) at 21:13

## 2018-10-03 RX ADMIN — IPRATROPIUM BROMIDE AND ALBUTEROL SULFATE 1 AMPULE: 2.5; .5 SOLUTION RESPIRATORY (INHALATION) at 01:08

## 2018-10-03 RX ADMIN — Medication 10 ML: at 08:47

## 2018-10-03 RX ADMIN — IPRATROPIUM BROMIDE AND ALBUTEROL SULFATE 1 AMPULE: 2.5; .5 SOLUTION RESPIRATORY (INHALATION) at 04:02

## 2018-10-03 RX ADMIN — WARFARIN SODIUM 2.5 MG: 2.5 TABLET ORAL at 17:59

## 2018-10-03 RX ADMIN — PHENYLEPHRINE HYDROCHLORIDE 250 MCG/MIN: 10 INJECTION INTRAVENOUS at 03:00

## 2018-10-03 ASSESSMENT — PAIN SCALES - GENERAL
PAINLEVEL_OUTOF10: 0

## 2018-10-03 NOTE — PLAN OF CARE
Problem: Falls - Risk of:  Goal: Will remain free from falls  Will remain free from falls   Outcome: Met This Shift      Problem: Gas Exchange - Impaired:  Goal: Levels of oxygenation will improve  Levels of oxygenation will improve   Outcome: Met This Shift      Problem: Cardiac Output - Decreased:  Goal: Hemodynamic stability will improve  Hemodynamic stability will improve   Outcome: Ongoing

## 2018-10-03 NOTE — PROGRESS NOTES
[facility] Quality Flow/Interdisciplinary Rounds Progress Note        Quality Flow Rounds held on October 3, 2018    Disciplines Attending:  Bedside Nurse, Charge nurse, ROMAN, OT, PT, , and . Lizz Denis was admitted on 10/1/2018 10:23 AM    Anticipated Discharge Date:  Expected Discharge Date: 10/08/18    Disposition:    Baldemar Score:  Baldemar Scale Score: 13    Readmission Risk              Risk of Unplanned Readmission:        18             Discussed patient goal for the day, patient clinical progression, and barriers to discharge. The following Goal(s) of the Day/Commitment(s) have been identified:  Hemo today, wean pressors.       Yoel Henry  October 3, 2018

## 2018-10-03 NOTE — PROGRESS NOTES
10/1/2018  Patient MRN:  [id number] : 1934 Age: 80 years Gender: Male Order Date:  10/1/2018 11:00 AM EXAM: XR CHEST PORTABLE NUMBER OF IMAGES:  1 view INDICATION:  Concern for sepsis COMPARISON: Chest x-ray May 20, 2017 FINDINGS:  Cardiac silhouette appears mildly enlarged. Atherosclerotic calcifications of the aortic arch. Mild airspace opacities of the lower lungs more pronounced on the right may represent atelectasis or infiltrates. No edema. No significant pleural effusion or pneumothorax. Upper abdomen is unremarkable. IVC filter. 1. Mild airspace opacities of the lower lungs more pronounced on the right may represent atelectasis or infiltrates. Objective:   Vitals: BP (!) 124/44   Pulse 75   Temp 97.1 °F (36.2 °C) (Temporal)   Resp 20   Ht 5' 7\" (1.702 m)   Wt 208 lb (94.3 kg)   SpO2 100%   BMI 32.58 kg/m²   General appearance: appears stated age   Skin:  No rashes or lesions  HEENT: Head: Normocephalic, no lesions, without obvious abnormality.   Neck: no adenopathy, no carotid bruit, no JVD, supple, symmetrical, trachea midline and thyroid not enlarged, symmetric, no tenderness/mass/nodules  Lungs: clear to auscultation bilaterally  Heart: regular rate and rhythm, S1, S2 normal, no murmur, click, rub or gallop  Abdomen: soft, non-tender; bowel sounds normal; no masses,  no organomegaly  Extremities: extremities normal, atraumatic, no cyanosis or edema  Neurologic: Mental status: Alert, oriented, thought content appropriate    Assessment:   Patient Active Problem List:     HTN (hypertension), benign     Anemia of chronic disease     Mixed hyperlipidemia     Pulmonary hypertension (HCC)     Mild tricuspid regurgitation     Chronic systolic congestive heart failure (HCC)     Paroxysmal atrial fibrillation (HCC)     End stage renal failure on dialysis ([address]     Septic shock (HCC)     Warfarin-induced coagulopathy (HCC)     Lobar pneumonia ([address]     Encounter for palliative care    Plan:

## 2018-10-04 ENCOUNTER — APPOINTMENT (OUTPATIENT)
Dept: GENERAL RADIOLOGY | Age: 83
DRG: 871 | End: 2018-10-04
Payer: MEDICARE

## 2018-10-04 LAB
ALBUMIN SERPL-MCNC: 2.5 G/DL (ref 3.5–5.2)
ALP BLD-CCNC: 47 U/L (ref 40–129)
ALT SERPL-CCNC: <5 U/L (ref 0–40)
ANION GAP SERPL CALCULATED.3IONS-SCNC: 15 MMOL/L (ref 7–16)
ANISOCYTOSIS: ABNORMAL
AST SERPL-CCNC: 11 U/L (ref 0–39)
B.E.: 1.7 MMOL/L (ref -3–3)
BASOPHILIC STIPPLING: ABNORMAL
BASOPHILS ABSOLUTE: 0 E9/L (ref 0–0.2)
BASOPHILS RELATIVE PERCENT: 0.5 % (ref 0–2)
BILIRUB SERPL-MCNC: 0.9 MG/DL (ref 0–1.2)
BUN BLDV-MCNC: 30 MG/DL (ref 8–23)
CALCIUM SERPL-MCNC: 7.7 MG/DL (ref 8.6–10.2)
CHLORIDE BLD-SCNC: 91 MMOL/L (ref 98–107)
CHLORIDE URINE RANDOM: <20 MMOL/L
CO2: 24 MMOL/L (ref 22–29)
COHB: 1.1 % (ref 0–1.5)
CORTISOL TOTAL: 13.56 MCG/DL (ref 2.68–18.4)
CREAT SERPL-MCNC: 6.5 MG/DL (ref 0.7–1.2)
CRITICAL: ABNORMAL
DATE ANALYZED: ABNORMAL
DATE OF COLLECTION: ABNORMAL
EKG ATRIAL RATE: 159 BPM
EKG Q-T INTERVAL: 262 MS
EKG QRS DURATION: 108 MS
EKG QTC CALCULATION (BAZETT): 447 MS
EKG R AXIS: 6 DEGREES
EKG T AXIS: -165 DEGREES
EKG VENTRICULAR RATE: 175 BPM
EOSINOPHILS ABSOLUTE: 0.17 E9/L (ref 0.05–0.5)
EOSINOPHILS RELATIVE PERCENT: 0.9 % (ref 0–6)
GFR AFRICAN AMERICAN: 10
GFR NON-AFRICAN AMERICAN: 10 ML/MIN/1.73
GLUCOSE BLD-MCNC: 109 MG/DL (ref 74–109)
HCO3: 26.8 MMOL/L (ref 22–26)
HCT VFR BLD CALC: 22.3 % (ref 37–54)
HCT VFR BLD CALC: 22.4 % (ref 37–54)
HCT VFR BLD CALC: 26.6 % (ref 37–54)
HEMOGLOBIN: 7.2 G/DL (ref 12.5–16.5)
HEMOGLOBIN: 7.2 G/DL (ref 12.5–16.5)
HEMOGLOBIN: 8.5 G/DL (ref 12.5–16.5)
HHB: 1.7 % (ref 0–5)
INR BLD: 2.8
LAB: 9558
LYMPHOCYTES ABSOLUTE: 1.33 E9/L (ref 1.5–4)
LYMPHOCYTES RELATIVE PERCENT: 7.1 % (ref 20–42)
Lab: ABNORMAL
MAGNESIUM: 1.7 MG/DL (ref 1.6–2.6)
MCH RBC QN AUTO: 30 PG (ref 26–35)
MCHC RBC AUTO-ENTMCNC: 32.3 % (ref 32–34.5)
MCV RBC AUTO: 92.9 FL (ref 80–99.9)
METAMYELOCYTES RELATIVE PERCENT: 0.9 % (ref 0–1)
METHB: 0.2 % (ref 0–1.5)
MODE: ABNORMAL
MONOCYTES ABSOLUTE: 1.33 E9/L (ref 0.1–0.95)
MONOCYTES RELATIVE PERCENT: 7.1 % (ref 2–12)
MYELOCYTE PERCENT: 1.8 % (ref 0–0)
NEUTROPHILS ABSOLUTE: 16.15 E9/L (ref 1.8–7.3)
NEUTROPHILS RELATIVE PERCENT: 82.1 % (ref 43–80)
NUCLEATED RED BLOOD CELLS: 0.9 /100 WBC
O2 CONTENT: 11.3 ML/DL
O2 SATURATION: 98.3 % (ref 92–98.5)
O2HB: 97 % (ref 94–97)
OPERATOR ID: ABNORMAL
OSMOLALITY URINE: 285 MOSM/KG (ref 300–900)
OSMOLALITY: 293 MOSM/KG (ref 285–310)
OVALOCYTES: ABNORMAL
PATIENT TEMP: 37 C
PCO2: 45 MMHG (ref 35–45)
PDW BLD-RTO: 23.2 FL (ref 11.5–15)
PH BLOOD GAS: 7.39 (ref 7.35–7.45)
PHOSPHORUS: 4.3 MG/DL (ref 2.5–4.5)
PLATELET # BLD: 342 E9/L (ref 130–450)
PMV BLD AUTO: 10.6 FL (ref 7–12)
PO2: 110.1 MMHG (ref 60–100)
POIKILOCYTES: ABNORMAL
POLYCHROMASIA: ABNORMAL
POTASSIUM SERPL-SCNC: 3.4 MMOL/L (ref 3.5–5)
POTASSIUM, UR: 30.6 MMOL/L
PROCALCITONIN: 2.22 NG/ML (ref 0–0.08)
PROTHROMBIN TIME: 32.1 SEC (ref 9.3–12.4)
RBC # BLD: 2.4 E12/L (ref 3.8–5.8)
SCHISTOCYTES: ABNORMAL
SODIUM BLD-SCNC: 130 MMOL/L (ref 132–146)
SODIUM URINE: 57 MMOL/L
SOURCE, BLOOD GAS: ABNORMAL
THB: 8.1 G/DL (ref 11.5–16.5)
TIME ANALYZED: 503
TOTAL PROTEIN: 5.6 G/DL (ref 6.4–8.3)
VANCOMYCIN RANDOM: 27.8 MCG/ML (ref 5–40)
WBC # BLD: 19 E9/L (ref 4.5–11.5)

## 2018-10-04 PROCEDURE — 36415 COLL VENOUS BLD VENIPUNCTURE: CPT

## 2018-10-04 PROCEDURE — 2000000000 HC ICU R&B

## 2018-10-04 PROCEDURE — 2580000003 HC RX 258: Performed by: INTERNAL MEDICINE

## 2018-10-04 PROCEDURE — 6360000002 HC RX W HCPCS: Performed by: INTERNAL MEDICINE

## 2018-10-04 PROCEDURE — 83935 ASSAY OF URINE OSMOLALITY: CPT

## 2018-10-04 PROCEDURE — 80202 ASSAY OF VANCOMYCIN: CPT

## 2018-10-04 PROCEDURE — 85610 PROTHROMBIN TIME: CPT

## 2018-10-04 PROCEDURE — 85025 COMPLETE CBC W/AUTO DIFF WBC: CPT

## 2018-10-04 PROCEDURE — 84133 ASSAY OF URINE POTASSIUM: CPT

## 2018-10-04 PROCEDURE — 6370000000 HC RX 637 (ALT 250 FOR IP): Performed by: INTERNAL MEDICINE

## 2018-10-04 PROCEDURE — 36430 TRANSFUSION BLD/BLD COMPNT: CPT

## 2018-10-04 PROCEDURE — 71045 X-RAY EXAM CHEST 1 VIEW: CPT

## 2018-10-04 PROCEDURE — 82436 ASSAY OF URINE CHLORIDE: CPT

## 2018-10-04 PROCEDURE — 85014 HEMATOCRIT: CPT

## 2018-10-04 PROCEDURE — 84145 PROCALCITONIN (PCT): CPT

## 2018-10-04 PROCEDURE — 83930 ASSAY OF BLOOD OSMOLALITY: CPT

## 2018-10-04 PROCEDURE — 83735 ASSAY OF MAGNESIUM: CPT

## 2018-10-04 PROCEDURE — 84300 ASSAY OF URINE SODIUM: CPT

## 2018-10-04 PROCEDURE — P9016 RBC LEUKOCYTES REDUCED: HCPCS

## 2018-10-04 PROCEDURE — 86923 COMPATIBILITY TEST ELECTRIC: CPT

## 2018-10-04 PROCEDURE — 82805 BLOOD GASES W/O2 SATURATION: CPT

## 2018-10-04 PROCEDURE — 51702 INSERT TEMP BLADDER CATH: CPT

## 2018-10-04 PROCEDURE — 2500000003 HC RX 250 WO HCPCS: Performed by: INTERNAL MEDICINE

## 2018-10-04 PROCEDURE — 2700000000 HC OXYGEN THERAPY PER DAY

## 2018-10-04 PROCEDURE — 94640 AIRWAY INHALATION TREATMENT: CPT

## 2018-10-04 PROCEDURE — 85018 HEMOGLOBIN: CPT

## 2018-10-04 PROCEDURE — 80053 COMPREHEN METABOLIC PANEL: CPT

## 2018-10-04 PROCEDURE — 84100 ASSAY OF PHOSPHORUS: CPT

## 2018-10-04 PROCEDURE — 93010 ELECTROCARDIOGRAM REPORT: CPT | Performed by: INTERNAL MEDICINE

## 2018-10-04 PROCEDURE — 99231 SBSQ HOSP IP/OBS SF/LOW 25: CPT | Performed by: CLINICAL NURSE SPECIALIST

## 2018-10-04 PROCEDURE — 82533 TOTAL CORTISOL: CPT

## 2018-10-04 RX ORDER — 0.9 % SODIUM CHLORIDE 0.9 %
250 INTRAVENOUS SOLUTION INTRAVENOUS ONCE
Status: COMPLETED | OUTPATIENT
Start: 2018-10-04 | End: 2018-10-04

## 2018-10-04 RX ORDER — POTASSIUM CHLORIDE 29.8 MG/ML
20 INJECTION INTRAVENOUS ONCE
Status: DISCONTINUED | OUTPATIENT
Start: 2018-10-04 | End: 2018-10-04

## 2018-10-04 RX ORDER — PANTOPRAZOLE SODIUM 40 MG/1
40 TABLET, DELAYED RELEASE ORAL
Status: DISCONTINUED | OUTPATIENT
Start: 2018-10-04 | End: 2018-10-10 | Stop reason: HOSPADM

## 2018-10-04 RX ORDER — POTASSIUM CHLORIDE 7.45 MG/ML
10 INJECTION INTRAVENOUS ONCE
Status: DISCONTINUED | OUTPATIENT
Start: 2018-10-04 | End: 2018-10-04

## 2018-10-04 RX ADMIN — IPRATROPIUM BROMIDE AND ALBUTEROL SULFATE 1 AMPULE: 2.5; .5 SOLUTION RESPIRATORY (INHALATION) at 16:05

## 2018-10-04 RX ADMIN — IPRATROPIUM BROMIDE AND ALBUTEROL SULFATE 1 AMPULE: 2.5; .5 SOLUTION RESPIRATORY (INHALATION) at 09:35

## 2018-10-04 RX ADMIN — MICONAZOLE NITRATE: 1.42 POWDER TOPICAL at 10:46

## 2018-10-04 RX ADMIN — MICONAZOLE NITRATE: 1.42 POWDER TOPICAL at 20:48

## 2018-10-04 RX ADMIN — ATORVASTATIN CALCIUM 20 MG: 20 TABLET, FILM COATED ORAL at 20:47

## 2018-10-04 RX ADMIN — LEVOTHYROXINE SODIUM 50 MCG: 50 TABLET ORAL at 07:19

## 2018-10-04 RX ADMIN — SODIUM CHLORIDE 250 ML: 9 INJECTION, SOLUTION INTRAVENOUS at 07:11

## 2018-10-04 RX ADMIN — MIDODRINE HYDROCHLORIDE 10 MG: 5 TABLET ORAL at 12:33

## 2018-10-04 RX ADMIN — MIDODRINE HYDROCHLORIDE 10 MG: 5 TABLET ORAL at 08:33

## 2018-10-04 RX ADMIN — PHENYLEPHRINE HYDROCHLORIDE 150 MCG/MIN: 10 INJECTION INTRAVENOUS at 22:14

## 2018-10-04 RX ADMIN — CEFEPIME HYDROCHLORIDE 1 G: 1 INJECTION, POWDER, FOR SOLUTION INTRAMUSCULAR; INTRAVENOUS at 12:33

## 2018-10-04 RX ADMIN — PHENYLEPHRINE HYDROCHLORIDE 100 MCG/MIN: 10 INJECTION INTRAVENOUS at 13:46

## 2018-10-04 RX ADMIN — IPRATROPIUM BROMIDE AND ALBUTEROL SULFATE 1 AMPULE: 2.5; .5 SOLUTION RESPIRATORY (INHALATION) at 05:05

## 2018-10-04 RX ADMIN — Medication 10 ML: at 08:34

## 2018-10-04 RX ADMIN — IPRATROPIUM BROMIDE AND ALBUTEROL SULFATE 1 AMPULE: 2.5; .5 SOLUTION RESPIRATORY (INHALATION) at 12:10

## 2018-10-04 RX ADMIN — IPRATROPIUM BROMIDE AND ALBUTEROL SULFATE 1 AMPULE: 2.5; .5 SOLUTION RESPIRATORY (INHALATION) at 19:43

## 2018-10-04 RX ADMIN — IPRATROPIUM BROMIDE AND ALBUTEROL SULFATE 1 AMPULE: 2.5; .5 SOLUTION RESPIRATORY (INHALATION) at 01:17

## 2018-10-04 RX ADMIN — MIDODRINE HYDROCHLORIDE 10 MG: 5 TABLET ORAL at 16:55

## 2018-10-04 RX ADMIN — Medication 10 ML: at 20:48

## 2018-10-04 RX ADMIN — PANTOPRAZOLE SODIUM 40 MG: 40 TABLET, DELAYED RELEASE ORAL at 12:33

## 2018-10-04 ASSESSMENT — PAIN SCALES - GENERAL
PAINLEVEL_OUTOF10: 0

## 2018-10-04 NOTE — PROGRESS NOTES
Pharmacy Consultation Note  (Antibiotic Dosing and Monitoring)    Initial consult date: 10/2  Consulting physician: Govind Virk  Drug(s): Vancomycin  Indication: Septic shock    Ht Readings from Last 1 Encounters:   10/01/18 5' 7\" (1.702 m)     Wt Readings from Last 1 Encounters:   10/04/18 213 lb 13.5 oz (97 kg)       Age/  Gender Actual weight IBW DW  Allergy Information   80 y.o. male 93.3 kg  66.1 kg  76.8 kg  Patient has no known allergies.                  Date  WBC BUN/CR Drug/Dose Time   Given Level(s)   (Time) Comments   10/2 20.4 ESRD   Vancomycin 1.5g IV x1 (given 10/1@ 1500) 10/1 @ 1500     10/3  25 ESRD  HD started at 1700  Vancomycin 1250 mg IV x 1   2130 after HD     10/4 19  ESRD No HD today, no vancomycin doses                     Intake/Output Summary (Last 24 hours) at 10/04/18 1729  Last data filed at 10/04/18 1500   Gross per 24 hour   Intake             1810 ml   Output              219 ml   Net             1591 ml   no residual urine output    Temp max: 96.6  Cultures:    10/3 repeat blood cultures negative to date  10/1 blood cultures both bottles gram positive rods in 1 bottle, GPC clusters in 1 bottle; coag neg staph   10/1 resp film array negative  10/1 legionella urinary not obtained - no urine output        Assessment:  · 83yo M,hx ESRD, hypotensive, leukocytosis,pna vs infected hemodialysis catheter  · Hx ESRD, usual HD treatments on TTS, HD ordered for yesterday (Wednesday 10/3)  · Goal vancomycin trough 15-20mcg/mL    Plan:  · No vancomycin today; last dose yesterday after HD and no HD today  · Pharmacist will follow and monitor/adjust dosing as necessary      Arlette Guadalupe PharmD, BCPS, BCCCP 10/4/2018 5:29 PM

## 2018-10-04 NOTE — PROGRESS NOTES
pneumonia ([facility]     Encounter for palliative care    Plan:                                   1  ESRD hemodialysis TTS , no plans for HD today; allow for weaning pressors                                      2 Sepsis , suspected Pneumonia , symptoms better at present ,  blood c/s  Polymicrobial: GPR, GPC; on Vanc and Cefepime                                      3  A fib , warfarin on hold ( coagulopathy ) , recent course of levaquin                                     4  Septic SHOCK  On Pressor support -- weaning in progress ; will check cortisol level                                     5   Anemia, possible blood loss; Blood transfusion today for 1 unit               Updated family at bedside  Jose Torres 62.

## 2018-10-04 NOTE — PROGRESS NOTES
O2SAT    ASSESSMENT AND PLAN          Assessment:   GPR bacteremia  ESRD  HFpEF  A-fib RVR  HCAP  Supratherapeutic INR      Plan:   Antibiotics per ID  Rate control  Dialysis per Nephrology  Resume coumadin as INR normalizes    Carlyle Jacques D.O.  CCT 39 min

## 2018-10-05 ENCOUNTER — APPOINTMENT (OUTPATIENT)
Dept: GENERAL RADIOLOGY | Age: 83
DRG: 871 | End: 2018-10-05
Payer: MEDICARE

## 2018-10-05 LAB
ALBUMIN SERPL-MCNC: 2.4 G/DL (ref 3.5–5.2)
ALP BLD-CCNC: 55 U/L (ref 40–129)
ALT SERPL-CCNC: <5 U/L (ref 0–40)
ANION GAP SERPL CALCULATED.3IONS-SCNC: 18 MMOL/L (ref 7–16)
ANISOCYTOSIS: ABNORMAL
AST SERPL-CCNC: 12 U/L (ref 0–39)
BASOPHILS ABSOLUTE: 0 E9/L (ref 0–0.2)
BASOPHILS RELATIVE PERCENT: 0.4 % (ref 0–2)
BILIRUB SERPL-MCNC: 0.9 MG/DL (ref 0–1.2)
BLOOD BANK DISPENSE STATUS: NORMAL
BLOOD BANK DISPENSE STATUS: NORMAL
BLOOD BANK PRODUCT CODE: NORMAL
BLOOD BANK PRODUCT CODE: NORMAL
BPU ID: NORMAL
BPU ID: NORMAL
BUN BLDV-MCNC: 37 MG/DL (ref 8–23)
CALCIUM SERPL-MCNC: 7.7 MG/DL (ref 8.6–10.2)
CHLORIDE BLD-SCNC: 91 MMOL/L (ref 98–107)
CO2: 22 MMOL/L (ref 22–29)
CREAT SERPL-MCNC: 7.6 MG/DL (ref 0.7–1.2)
DESCRIPTION BLOOD BANK: NORMAL
DESCRIPTION BLOOD BANK: NORMAL
EOSINOPHILS ABSOLUTE: 0.48 E9/L (ref 0.05–0.5)
EOSINOPHILS RELATIVE PERCENT: 2.6 % (ref 0–6)
GFR AFRICAN AMERICAN: 8
GFR NON-AFRICAN AMERICAN: 8 ML/MIN/1.73
GLUCOSE BLD-MCNC: 102 MG/DL (ref 74–109)
HCT VFR BLD CALC: 26.6 % (ref 37–54)
HCT VFR BLD CALC: 27 % (ref 37–54)
HCT VFR BLD CALC: 27.1 % (ref 37–54)
HCT VFR BLD CALC: 27.2 % (ref 37–54)
HCT VFR BLD CALC: 27.3 % (ref 37–54)
HEMOGLOBIN: 8.5 G/DL (ref 12.5–16.5)
HEMOGLOBIN: 8.8 G/DL (ref 12.5–16.5)
HEMOGLOBIN: 8.9 G/DL (ref 12.5–16.5)
INR BLD: 3.5
LYMPHOCYTES ABSOLUTE: 1.3 E9/L (ref 1.5–4)
LYMPHOCYTES RELATIVE PERCENT: 7 % (ref 20–42)
MAGNESIUM: 1.7 MG/DL (ref 1.6–2.6)
MCH RBC QN AUTO: 29.4 PG (ref 26–35)
MCHC RBC AUTO-ENTMCNC: 32.5 % (ref 32–34.5)
MCV RBC AUTO: 90.6 FL (ref 80–99.9)
METAMYELOCYTES RELATIVE PERCENT: 1.8 % (ref 0–1)
MONOCYTES ABSOLUTE: 0.93 E9/L (ref 0.1–0.95)
MONOCYTES RELATIVE PERCENT: 5.3 % (ref 2–12)
NEUTROPHILS ABSOLUTE: 15.81 E9/L (ref 1.8–7.3)
NEUTROPHILS RELATIVE PERCENT: 83.3 % (ref 43–80)
NUCLEATED RED BLOOD CELLS: 0.9 /100 WBC
OVALOCYTES: ABNORMAL
PDW BLD-RTO: 22.3 FL (ref 11.5–15)
PHOSPHORUS: 5.1 MG/DL (ref 2.5–4.5)
PLATELET # BLD: 319 E9/L (ref 130–450)
PMV BLD AUTO: 11.1 FL (ref 7–12)
POIKILOCYTES: ABNORMAL
POLYCHROMASIA: ABNORMAL
POTASSIUM SERPL-SCNC: 3.5 MMOL/L (ref 3.5–5)
PROTHROMBIN TIME: 40 SEC (ref 9.3–12.4)
RBC # BLD: 2.99 E12/L (ref 3.8–5.8)
SCHISTOCYTES: ABNORMAL
SODIUM BLD-SCNC: 131 MMOL/L (ref 132–146)
SPHEROCYTES: ABNORMAL
TOTAL PROTEIN: 5.6 G/DL (ref 6.4–8.3)
VANCOMYCIN RANDOM: 23.5 MCG/ML (ref 5–40)
VANCOMYCIN RANDOM: 23.8 MCG/ML (ref 5–40)
WBC # BLD: 18.6 E9/L (ref 4.5–11.5)

## 2018-10-05 PROCEDURE — 85018 HEMOGLOBIN: CPT

## 2018-10-05 PROCEDURE — 6360000002 HC RX W HCPCS: Performed by: INTERNAL MEDICINE

## 2018-10-05 PROCEDURE — 2700000000 HC OXYGEN THERAPY PER DAY

## 2018-10-05 PROCEDURE — 94640 AIRWAY INHALATION TREATMENT: CPT

## 2018-10-05 PROCEDURE — 2000000000 HC ICU R&B

## 2018-10-05 PROCEDURE — 84100 ASSAY OF PHOSPHORUS: CPT

## 2018-10-05 PROCEDURE — 80202 ASSAY OF VANCOMYCIN: CPT

## 2018-10-05 PROCEDURE — 83735 ASSAY OF MAGNESIUM: CPT

## 2018-10-05 PROCEDURE — 6370000000 HC RX 637 (ALT 250 FOR IP): Performed by: INTERNAL MEDICINE

## 2018-10-05 PROCEDURE — 2580000003 HC RX 258: Performed by: INTERNAL MEDICINE

## 2018-10-05 PROCEDURE — 85014 HEMATOCRIT: CPT

## 2018-10-05 PROCEDURE — 85610 PROTHROMBIN TIME: CPT

## 2018-10-05 PROCEDURE — 85025 COMPLETE CBC W/AUTO DIFF WBC: CPT

## 2018-10-05 PROCEDURE — 36415 COLL VENOUS BLD VENIPUNCTURE: CPT

## 2018-10-05 PROCEDURE — 71045 X-RAY EXAM CHEST 1 VIEW: CPT

## 2018-10-05 PROCEDURE — 80053 COMPREHEN METABOLIC PANEL: CPT

## 2018-10-05 RX ADMIN — IPRATROPIUM BROMIDE AND ALBUTEROL SULFATE 1 AMPULE: 2.5; .5 SOLUTION RESPIRATORY (INHALATION) at 12:20

## 2018-10-05 RX ADMIN — PANTOPRAZOLE SODIUM 40 MG: 40 TABLET, DELAYED RELEASE ORAL at 06:10

## 2018-10-05 RX ADMIN — ATORVASTATIN CALCIUM 20 MG: 20 TABLET, FILM COATED ORAL at 20:49

## 2018-10-05 RX ADMIN — MIDODRINE HYDROCHLORIDE 10 MG: 5 TABLET ORAL at 09:02

## 2018-10-05 RX ADMIN — MIDODRINE HYDROCHLORIDE 10 MG: 5 TABLET ORAL at 13:29

## 2018-10-05 RX ADMIN — IPRATROPIUM BROMIDE AND ALBUTEROL SULFATE 1 AMPULE: 2.5; .5 SOLUTION RESPIRATORY (INHALATION) at 16:32

## 2018-10-05 RX ADMIN — HYDROCORTISONE SODIUM SUCCINATE 100 MG: 100 INJECTION, POWDER, FOR SOLUTION INTRAMUSCULAR; INTRAVENOUS at 20:49

## 2018-10-05 RX ADMIN — Medication 10 ML: at 09:16

## 2018-10-05 RX ADMIN — MICONAZOLE NITRATE: 1.42 POWDER TOPICAL at 20:50

## 2018-10-05 RX ADMIN — MICONAZOLE NITRATE: 1.42 POWDER TOPICAL at 09:16

## 2018-10-05 RX ADMIN — HYDROCORTISONE SODIUM SUCCINATE 100 MG: 100 INJECTION, POWDER, FOR SOLUTION INTRAMUSCULAR; INTRAVENOUS at 12:06

## 2018-10-05 RX ADMIN — Medication 10 ML: at 20:49

## 2018-10-05 RX ADMIN — PHENYLEPHRINE HYDROCHLORIDE 50 MCG/MIN: 10 INJECTION INTRAVENOUS at 16:40

## 2018-10-05 RX ADMIN — LEVOTHYROXINE SODIUM 50 MCG: 50 TABLET ORAL at 06:10

## 2018-10-05 RX ADMIN — PHENYLEPHRINE HYDROCHLORIDE 125 MCG/MIN: 10 INJECTION INTRAVENOUS at 06:10

## 2018-10-05 RX ADMIN — IPRATROPIUM BROMIDE AND ALBUTEROL SULFATE 1 AMPULE: 2.5; .5 SOLUTION RESPIRATORY (INHALATION) at 00:15

## 2018-10-05 RX ADMIN — IPRATROPIUM BROMIDE AND ALBUTEROL SULFATE 1 AMPULE: 2.5; .5 SOLUTION RESPIRATORY (INHALATION) at 09:28

## 2018-10-05 RX ADMIN — MIDODRINE HYDROCHLORIDE 10 MG: 5 TABLET ORAL at 18:13

## 2018-10-05 RX ADMIN — IPRATROPIUM BROMIDE AND ALBUTEROL SULFATE 1 AMPULE: 2.5; .5 SOLUTION RESPIRATORY (INHALATION) at 20:47

## 2018-10-05 ASSESSMENT — PAIN SCALES - GENERAL
PAINLEVEL_OUTOF10: 0

## 2018-10-05 NOTE — PROGRESS NOTES
or atelectasis. Xr Chest Portable    Result Date: 10/1/2018  Patient MRN:  [id number] : 1934 Age: 80 years Gender: Male Order Date:  10/1/2018 11:00 AM EXAM: XR CHEST PORTABLE NUMBER OF IMAGES:  1 view INDICATION:  Concern for sepsis COMPARISON: Chest x-ray May 20, 2017 FINDINGS:  Cardiac silhouette appears mildly enlarged. Atherosclerotic calcifications of the aortic arch. Mild airspace opacities of the lower lungs more pronounced on the right may represent atelectasis or infiltrates. No edema. No significant pleural effusion or pneumothorax. Upper abdomen is unremarkable. IVC filter. 1. Mild airspace opacities of the lower lungs more pronounced on the right may represent atelectasis or infiltrates. Objective:   Vitals: BP (!) 110/45   Pulse 78   Temp 97 °F (36.1 °C) (Temporal)   Resp 20   Ht 5' 7\" (1.702 m)   Wt 213 lb 12.8 oz (97 kg)   SpO2 96%   BMI 33.49 kg/m²   General appearance: appears stated age ; in no acute distress  Skin:  No rashes or lesions  HEENT: Head: Normocephalic, no lesions, without obvious abnormality.   Neck: no adenopathy, no carotid bruit, no JVD, supple, symmetrical, trachea midline and thyroid not enlarged, symmetric, no tenderness/mass/nodules  Lungs: clear to auscultation bilaterally  Heart: regular rate and rhythm, S1, S2 normal, no murmur, click, rub or gallop  Abdomen: soft, non-tender; bowel sounds normal; no masses,  no organomegaly  Extremities: extremities normal, atraumatic, no cyanosis or edema  Neurologic: Mental status: Alert, oriented, thought content appropriate    Assessment:   Patient Active Problem List:     HTN (hypertension), benign     Anemia of chronic disease     Mixed hyperlipidemia     Pulmonary hypertension (HCC)     Mild tricuspid regurgitation     Chronic systolic congestive heart failure (HCC)     Paroxysmal atrial fibrillation (HCC)     End stage renal failure on dialysis ([facility]     Septic shock (HCC)     Warfarin-induced coagulopathy ([facility] [address]     Lobar pneumonia ([facility] [address]     Encounter for palliative care    Plan:                                   1  ESRD hemodialysis TTS , no plans for HD today; allow for weaning pressors                                      2 Sepsis , suspected Pneumonia , symptoms better at present ,  blood c/s  Polymicrobial: GPR, GPC; on Vanc and Cefepime                                      3  A fib , warfarin on hold ( coagulopathy ) , recent course of levaquin                                     4  Septic SHOCK  On Pressor support -- weaning in progress ; will check cortisol level                                     5   Anemia, possible blood loss; Blood transfusion  for 1 unit yesterday; H/H stable               Updated family at bedside  Jose Torres 62.

## 2018-10-05 NOTE — PLAN OF CARE
Problem: Falls - Risk of:  Goal: Will remain free from falls  Will remain free from falls   Outcome: Met This Shift      Problem: Pain:  Goal: Control of acute pain  Control of acute pain   Outcome: Met This Shift      Problem: Risk for Impaired Skin Integrity  Goal: Tissue integrity - skin and mucous membranes  Structural intactness and normal physiological function of skin and  mucous membranes.    Outcome: Met This Shift      Problem: Gas Exchange - Impaired:  Goal: Levels of oxygenation will improve  Levels of oxygenation will improve   Outcome: Ongoing

## 2018-10-05 NOTE — PROGRESS NOTES
culture result.   Continue vancomycin   Monitoring labs     Plan discussed with Dr. Juliann Schroeder MD PGY-1  Internal medicine

## 2018-10-05 NOTE — PROGRESS NOTES
[facility] Quality Flow/Interdisciplinary Rounds Progress Note        Quality Flow Rounds held on October 5, 2018    Disciplines Attending:  Bedside Nurse, Charge nurse, ROMAN, OT, PT, , and . Karthik Crockett was admitted on 10/1/2018 10:23 AM    Anticipated Discharge Date:  Expected Discharge Date: 10/08/18    Disposition:    Baldemar Score:  Baldemar Scale Score: 15    Readmission Risk              Risk of Unplanned Readmission:        19             Discussed patient goal for the day, patient clinical progression, and barriers to discharge. The following Goal(s) of the Day/Commitment(s) have been identified:  Wean pressers, monitor labs and vital signs.       Deborrah Dipti  October 5, 2018

## 2018-10-05 NOTE — CARE COORDINATION
10/5  Transition of care notes  Remains in icu  Cont on dharmesh gtt  Palliative care following  Nephrology no hemo today  No family here  Lian Ortega RN Case Manager

## 2018-10-06 LAB
ALBUMIN SERPL-MCNC: 2.5 G/DL (ref 3.5–5.2)
ALP BLD-CCNC: 65 U/L (ref 40–129)
ALT SERPL-CCNC: 5 U/L (ref 0–40)
ANION GAP SERPL CALCULATED.3IONS-SCNC: 19 MMOL/L (ref 7–16)
ANISOCYTOSIS: ABNORMAL
AST SERPL-CCNC: 11 U/L (ref 0–39)
BASOPHILS ABSOLUTE: 0 E9/L (ref 0–0.2)
BASOPHILS RELATIVE PERCENT: 0.2 % (ref 0–2)
BILIRUB SERPL-MCNC: 0.6 MG/DL (ref 0–1.2)
BUN BLDV-MCNC: 48 MG/DL (ref 8–23)
BURR CELLS: ABNORMAL
CALCIUM SERPL-MCNC: 7.8 MG/DL (ref 8.6–10.2)
CHLORIDE BLD-SCNC: 90 MMOL/L (ref 98–107)
CO2: 21 MMOL/L (ref 22–29)
CREAT SERPL-MCNC: 8.7 MG/DL (ref 0.7–1.2)
EOSINOPHILS ABSOLUTE: 0 E9/L (ref 0.05–0.5)
EOSINOPHILS RELATIVE PERCENT: 0 % (ref 0–6)
GFR AFRICAN AMERICAN: 7
GFR NON-AFRICAN AMERICAN: 7 ML/MIN/1.73
GLUCOSE BLD-MCNC: 178 MG/DL (ref 74–109)
HCT VFR BLD CALC: 25.3 % (ref 37–54)
HCT VFR BLD CALC: 25.3 % (ref 37–54)
HCT VFR BLD CALC: 26.1 % (ref 37–54)
HEMOGLOBIN: 8.3 G/DL (ref 12.5–16.5)
HEMOGLOBIN: 8.3 G/DL (ref 12.5–16.5)
HEMOGLOBIN: 8.4 G/DL (ref 12.5–16.5)
INR BLD: 3.3
LYMPHOCYTES ABSOLUTE: 0.39 E9/L (ref 1.5–4)
LYMPHOCYTES RELATIVE PERCENT: 2.7 % (ref 20–42)
MAGNESIUM: 1.7 MG/DL (ref 1.6–2.6)
MCH RBC QN AUTO: 29.6 PG (ref 26–35)
MCHC RBC AUTO-ENTMCNC: 32.8 % (ref 32–34.5)
MCV RBC AUTO: 90.4 FL (ref 80–99.9)
MONOCYTES ABSOLUTE: 0.13 E9/L (ref 0.1–0.95)
MONOCYTES RELATIVE PERCENT: 0.9 % (ref 2–12)
MYELOCYTE PERCENT: 0.9 % (ref 0–0)
NEUTROPHILS ABSOLUTE: 12.71 E9/L (ref 1.8–7.3)
NEUTROPHILS RELATIVE PERCENT: 95.6 % (ref 43–80)
OVALOCYTES: ABNORMAL
PDW BLD-RTO: 22 FL (ref 11.5–15)
PHOSPHORUS: 6.3 MG/DL (ref 2.5–4.5)
PLATELET # BLD: 294 E9/L (ref 130–450)
PMV BLD AUTO: 10.3 FL (ref 7–12)
POIKILOCYTES: ABNORMAL
POLYCHROMASIA: ABNORMAL
POTASSIUM SERPL-SCNC: 3.8 MMOL/L (ref 3.5–5)
PROTHROMBIN TIME: 37.7 SEC (ref 9.3–12.4)
RBC # BLD: 2.8 E12/L (ref 3.8–5.8)
SODIUM BLD-SCNC: 130 MMOL/L (ref 132–146)
TOTAL PROTEIN: 5.6 G/DL (ref 6.4–8.3)
WBC # BLD: 13.1 E9/L (ref 4.5–11.5)

## 2018-10-06 PROCEDURE — 85018 HEMOGLOBIN: CPT

## 2018-10-06 PROCEDURE — 6360000002 HC RX W HCPCS: Performed by: INTERNAL MEDICINE

## 2018-10-06 PROCEDURE — 2580000003 HC RX 258

## 2018-10-06 PROCEDURE — 84100 ASSAY OF PHOSPHORUS: CPT

## 2018-10-06 PROCEDURE — 2580000003 HC RX 258: Performed by: INTERNAL MEDICINE

## 2018-10-06 PROCEDURE — 85014 HEMATOCRIT: CPT

## 2018-10-06 PROCEDURE — 36415 COLL VENOUS BLD VENIPUNCTURE: CPT

## 2018-10-06 PROCEDURE — 90935 HEMODIALYSIS ONE EVALUATION: CPT | Performed by: INTERNAL MEDICINE

## 2018-10-06 PROCEDURE — 6370000000 HC RX 637 (ALT 250 FOR IP): Performed by: INTERNAL MEDICINE

## 2018-10-06 PROCEDURE — 85025 COMPLETE CBC W/AUTO DIFF WBC: CPT

## 2018-10-06 PROCEDURE — 94640 AIRWAY INHALATION TREATMENT: CPT

## 2018-10-06 PROCEDURE — 85610 PROTHROMBIN TIME: CPT

## 2018-10-06 PROCEDURE — 80053 COMPREHEN METABOLIC PANEL: CPT

## 2018-10-06 PROCEDURE — 2000000000 HC ICU R&B

## 2018-10-06 PROCEDURE — 83735 ASSAY OF MAGNESIUM: CPT

## 2018-10-06 RX ADMIN — WATER 10 ML: 1 INJECTION INTRAMUSCULAR; INTRAVENOUS; SUBCUTANEOUS at 22:07

## 2018-10-06 RX ADMIN — IPRATROPIUM BROMIDE AND ALBUTEROL SULFATE 1 AMPULE: 2.5; .5 SOLUTION RESPIRATORY (INHALATION) at 17:49

## 2018-10-06 RX ADMIN — PHENYLEPHRINE HYDROCHLORIDE 30 MCG/MIN: 10 INJECTION INTRAVENOUS at 14:52

## 2018-10-06 RX ADMIN — MICONAZOLE NITRATE: 1.42 POWDER TOPICAL at 07:43

## 2018-10-06 RX ADMIN — IPRATROPIUM BROMIDE AND ALBUTEROL SULFATE 1 AMPULE: 2.5; .5 SOLUTION RESPIRATORY (INHALATION) at 08:16

## 2018-10-06 RX ADMIN — IPRATROPIUM BROMIDE AND ALBUTEROL SULFATE 1 AMPULE: 2.5; .5 SOLUTION RESPIRATORY (INHALATION) at 11:57

## 2018-10-06 RX ADMIN — VANCOMYCIN HYDROCHLORIDE 1250 MG: 10 INJECTION, POWDER, LYOPHILIZED, FOR SOLUTION INTRAVENOUS at 16:34

## 2018-10-06 RX ADMIN — MIDODRINE HYDROCHLORIDE 10 MG: 5 TABLET ORAL at 16:37

## 2018-10-06 RX ADMIN — ATORVASTATIN CALCIUM 20 MG: 20 TABLET, FILM COATED ORAL at 22:07

## 2018-10-06 RX ADMIN — MICONAZOLE NITRATE: 1.42 POWDER TOPICAL at 22:08

## 2018-10-06 RX ADMIN — MIDODRINE HYDROCHLORIDE 10 MG: 5 TABLET ORAL at 12:56

## 2018-10-06 RX ADMIN — PANTOPRAZOLE SODIUM 40 MG: 40 TABLET, DELAYED RELEASE ORAL at 07:43

## 2018-10-06 RX ADMIN — Medication 10 ML: at 07:43

## 2018-10-06 RX ADMIN — MIDODRINE HYDROCHLORIDE 10 MG: 5 TABLET ORAL at 07:43

## 2018-10-06 RX ADMIN — LEVOTHYROXINE SODIUM 50 MCG: 50 TABLET ORAL at 07:43

## 2018-10-06 RX ADMIN — HYDROCORTISONE SODIUM SUCCINATE 100 MG: 100 INJECTION, POWDER, FOR SOLUTION INTRAMUSCULAR; INTRAVENOUS at 22:07

## 2018-10-06 RX ADMIN — HYDROCORTISONE SODIUM SUCCINATE 100 MG: 100 INJECTION, POWDER, FOR SOLUTION INTRAMUSCULAR; INTRAVENOUS at 05:15

## 2018-10-06 RX ADMIN — Medication 10 ML: at 22:07

## 2018-10-06 RX ADMIN — IPRATROPIUM BROMIDE AND ALBUTEROL SULFATE 1 AMPULE: 2.5; .5 SOLUTION RESPIRATORY (INHALATION) at 20:52

## 2018-10-06 ASSESSMENT — PAIN SCALES - GENERAL
PAINLEVEL_OUTOF10: 0

## 2018-10-06 NOTE — PROGRESS NOTES
[facility] - Progress Note  CC- HCAP    Subjective: The patient is awake, alert and oriented. Tolerating medications. Reports no side effects. Afebrile. 10 ROS otherwise negative unless otherwise specified above. Objective:    BP (!) 134/55   Pulse 65   Temp 96.5 °F (35.8 °C)   Resp 19   Ht 5' 7\" (1.702 m)   Wt 219 lb 12.8 oz (99.7 kg)   SpO2 96%   BMI 34.43 kg/m²       General Appearance:    Awake, alert , no acute distress. HEENT:    Normocephalic,PERRL,neck supple, no JVD, mucosa moist, no thrush   Lungs:     Clear to auscultation bilaterally, no wheeze , crackles   Heart:    Regular rate and rhythm, no murmur   Abdomen:     Soft, non-tender, not distended  bowel sounds present,   Extremities:   Left arm AVF with bruit and palpable thrill   Skin:   no rashes or lesions     Labs:  CBC with Differential:    Lab Results   Component Value Date    WBC 13.1 10/06/2018    RBC 2.80 10/06/2018    HGB 8.3 10/06/2018    HCT 25.3 10/06/2018     10/06/2018    MCV 90.4 10/06/2018    MCH 29.6 10/06/2018    MCHC 32.8 10/06/2018    RDW 22.0 10/06/2018    NRBC 0.9 10/05/2018    SEGSPCT 73 03/15/2014    BANDSPCT 4 05/03/2016    BLASTSPCT 0.9 05/28/2017    METASPCT 1.8 10/05/2018    LYMPHOPCT 2.7 10/06/2018    PROMYELOPCT 0.9 05/29/2017    MONOPCT 0.9 10/06/2018    MYELOPCT 0.9 10/06/2018    BASOPCT 0.2 10/06/2018    MONOSABS 0.13 10/06/2018    LYMPHSABS 0.39 10/06/2018    EOSABS 0.00 10/06/2018    BASOSABS 0.00 10/06/2018        Microbiology :    Blood culture - Gram positive rods + GPC + CoNS, subculture grows Corynebacterium species, repeat blood cultures negative.      Radiology :    Chest X ray: reviewed    Assessment:  Sepsis corynebacterium, Coag-negative staph bacteremia, source-possible AVF had bleeding from the site for a while  Acute decompensated HFrEF with HCAP   Leukocytosis, improving     Recommendations:    Continue vancomycin -PTD- for 14 days  Monitoring labs

## 2018-10-06 NOTE — PLAN OF CARE
Problem: Falls - Risk of:  Goal: Will remain free from falls  Will remain free from falls   Outcome: Met This Shift      Problem: Pain:  Goal: Control of acute pain  Control of acute pain   Outcome: Met This Shift      Problem: Risk for Impaired Skin Integrity  Goal: Tissue integrity - skin and mucous membranes  Structural intactness and normal physiological function of skin and  mucous membranes.    Outcome: Met This Shift      Problem: Nutrition  Goal: Optimal nutrition therapy  Outcome: Ongoing

## 2018-10-06 NOTE — PROGRESS NOTES
0435  10/06/18   0448   PROTIME  32.1*  40.0*  37.7*   INR  2.8  3.5  3.3       APTT:   No results for input(s): APTT in the last 72 hours. Fasting Lipid Panel:    Lab Results   Component Value Date    CHOL 151 05/17/2014    TRIG 155 05/17/2014    HDL 21 05/17/2014       Cardiac Enzymes:    Lab Results   Component Value Date    CKTOTAL 56 05/03/2016    CKTOTAL 1,897 (H) 04/22/2016    CKTOTAL 2,237 (H) 04/21/2016    CKMB 3.6 04/10/2016    CKMB 4.3 04/10/2016    CKMB 2.0 01/11/2015    TROPONINI 0.50 (H) 10/01/2018    TROPONINI 0.56 (H) 10/01/2018    TROPONINI 0.21 (H) 05/20/2017       Notable Cultures:      Blood cultures   Blood Culture, Routine   Date Value Ref Range Status   10/03/2018 24 Hours- no growth  Preliminary     Respiratory cultures No results found for: RESPCULTURE No results found for: LABGRAM  Urine   Urine Culture, Routine   Date Value Ref Range Status   05/20/2017 Growth not present  Final     Legionella No results found for: LABLEGI  C Diff PCR No results found for: CDIFPCR  Wound culture/abscess: No results for input(s): WNDABS in the last 72 hours. Tip culture:No results for input(s): CXCATHTIP in the last 72 hours. Antibiotic  Days  Day started   Cefepime discontinued 10/1/18, stopped    Vancomycin      Intermittent dosing                Oxygen: Additional Respiratory  Assessments  Pulse: 70  Resp: 17  SpO2: 98 %  Oral Care: Mouth swabbed     Nasal cannula L/min  2   Face mask %     Reservoirs mask %           Lines:  Site  Day  Date inserted     TLC   R Femoral       10/1/18     PICC              Arterial line   R radial           Peripheral line              HD cath AVF Left arm      12/29/17     Urethral Catheter Non-latex 16 fr-Output (mL): 0 mL    Imaging Studies:    CXR 10/5/18   Continuing improvement in aeration of the lower lungs bilaterally,   with some residual atelectasis and/or pleural fluid, now minimal in   magnitude.      EKG: Rhythm Strip: atrial fibrillation, rate

## 2018-10-06 NOTE — PROGRESS NOTES
tricuspid regurgitation     Chronic systolic congestive heart failure per history     Paroxysmal atrial fibrillation      End stage renal failure on dialysis )     Septic shock  )     Warfarin-induced coagulopathy      Lobar pneumonia      GPR in blood- corynebacterium and cons - id following      On IV abx with vanc      Wean off pressors as bp allows      Renal following        Electronically signed by Ancelmo Tyson MD on 10/6/2018 at 7:50 AM

## 2018-10-06 NOTE — PROGRESS NOTES
Progress Note  10/6/2018 9:56 AM  Subjective:   Admit Date: 10/1/2018  PCP: Lulu Medina MD  Interval History:     Diet: Dietary Nutrition Supplements: Renal Oral Supplement  DIET RENAL; Dysphagia II Mechanically Altered    Data:   Scheduled Meds:   hydrocortisone sodium succinate PF  100 mg Intravenous Q8H    miconazole   Topical BID    pantoprazole  40 mg Oral QAM AC    vancomycin (VANCOCIN) intermittent dosing (placeholder)   Other RX Placeholder    midodrine  10 mg Oral TID    sodium chloride flush  10 mL Intravenous 2 times per day    ipratropium-albuterol  1 ampule Inhalation Q4H    atorvastatin  20 mg Oral Daily    levothyroxine  50 mcg Oral Daily     Continuous Infusions:   phenylephrine (FAM-SYNEPHRINE) 50mg/250mL infusion 20 mcg/min (10/05/18 1920)     PRN Meds:sodium chloride flush, ondansetron, acetaminophen  I/O last 3 completed shifts: In: 1050 [P.O.:560; I.V.:490]  Out: 120 [Urine:120]  I/O this shift: In: 48 [P.O.:50]  Out: 10 [Urine:10]    Intake/Output Summary (Last 24 hours) at 10/06/18 0956  Last data filed at 10/06/18 0955   Gross per 24 hour   Intake             1000 ml   Output              105 ml   Net              895 ml     CBC:   Recent Labs      10/04/18   0455   10/05/18   0435   10/06/18   0056  10/06/18   0448  10/06/18   0800   WBC  19.0*   --   18.6*   --    --   13.1*   --    HGB  7.2*   < >  8.8*  8.8*   < >  8.4*  8.3*  8.3*   PLT  342   --   319   --    --   294   --     < > = values in this interval not displayed.      BMP:  Recent Labs      10/04/18   0455  10/05/18   0435  10/06/18   0448   NA  130*  131*  130*   K  3.4*  3.5  3.8   CL  91*  91*  90*   CO2  24  22  21*   BUN  30*  37*  48*   CREATININE  6.5*  7.6*  8.7*   GLUCOSE  109  102  178*     Hepatic: Recent Labs      10/04/18   0455  10/05/18   0435  10/06/18   0448   AST  11  12  11   ALT  <5  <5  5   BILITOT  0.9  0.9  0.6   ALKPHOS  47  55  65     Troponin: No results for input(s): TROPONINI in the

## 2018-10-07 ENCOUNTER — APPOINTMENT (OUTPATIENT)
Dept: GENERAL RADIOLOGY | Age: 83
DRG: 871 | End: 2018-10-07
Payer: MEDICARE

## 2018-10-07 LAB
ALBUMIN SERPL-MCNC: 2.5 G/DL (ref 3.5–5.2)
ALP BLD-CCNC: 63 U/L (ref 40–129)
ALT SERPL-CCNC: 5 U/L (ref 0–40)
ANION GAP SERPL CALCULATED.3IONS-SCNC: 15 MMOL/L (ref 7–16)
ANISOCYTOSIS: ABNORMAL
AST SERPL-CCNC: 13 U/L (ref 0–39)
BASOPHILIC STIPPLING: ABNORMAL
BASOPHILS ABSOLUTE: 0.03 E9/L (ref 0–0.2)
BASOPHILS RELATIVE PERCENT: 0.2 % (ref 0–2)
BILIRUB SERPL-MCNC: 0.7 MG/DL (ref 0–1.2)
BLOOD CULTURE, ROUTINE: ABNORMAL
BLOOD CULTURE, ROUTINE: ABNORMAL
BUN BLDV-MCNC: 22 MG/DL (ref 8–23)
CALCIUM SERPL-MCNC: 8 MG/DL (ref 8.6–10.2)
CHLORIDE BLD-SCNC: 91 MMOL/L (ref 98–107)
CO2: 26 MMOL/L (ref 22–29)
CREAT SERPL-MCNC: 4.6 MG/DL (ref 0.7–1.2)
CULTURE, BLOOD 2: ABNORMAL
CULTURE, BLOOD 2: ABNORMAL
EOSINOPHILS ABSOLUTE: 0.03 E9/L (ref 0.05–0.5)
EOSINOPHILS RELATIVE PERCENT: 0.2 % (ref 0–6)
GFR AFRICAN AMERICAN: 15
GFR NON-AFRICAN AMERICAN: 15 ML/MIN/1.73
GLUCOSE BLD-MCNC: 134 MG/DL (ref 74–109)
HCT VFR BLD CALC: 24.5 % (ref 37–54)
HEMOGLOBIN: 7.8 G/DL (ref 12.5–16.5)
HYPOCHROMIA: ABNORMAL
IMMATURE GRANULOCYTES #: 0.18 E9/L
IMMATURE GRANULOCYTES %: 1.2 % (ref 0–5)
INR BLD: 3
LYMPHOCYTES ABSOLUTE: 0.56 E9/L (ref 1.5–4)
LYMPHOCYTES RELATIVE PERCENT: 3.9 % (ref 20–42)
MAGNESIUM: 1.8 MG/DL (ref 1.6–2.6)
MCH RBC QN AUTO: 29.7 PG (ref 26–35)
MCHC RBC AUTO-ENTMCNC: 31.8 % (ref 32–34.5)
MCV RBC AUTO: 93.2 FL (ref 80–99.9)
MONOCYTES ABSOLUTE: 0.62 E9/L (ref 0.1–0.95)
MONOCYTES RELATIVE PERCENT: 4.3 % (ref 2–12)
NEUTROPHILS ABSOLUTE: 13.1 E9/L (ref 1.8–7.3)
NEUTROPHILS RELATIVE PERCENT: 90.2 % (ref 43–80)
ORGANISM: ABNORMAL
ORGANISM: ABNORMAL
OVALOCYTES: ABNORMAL
PDW BLD-RTO: 22.5 FL (ref 11.5–15)
PHOSPHORUS: 4 MG/DL (ref 2.5–4.5)
PLATELET # BLD: 289 E9/L (ref 130–450)
PMV BLD AUTO: 11.1 FL (ref 7–12)
POIKILOCYTES: ABNORMAL
POLYCHROMASIA: ABNORMAL
POTASSIUM SERPL-SCNC: 4 MMOL/L (ref 3.5–5)
PROTHROMBIN TIME: 33.7 SEC (ref 9.3–12.4)
RBC # BLD: 2.63 E12/L (ref 3.8–5.8)
SCHISTOCYTES: ABNORMAL
SODIUM BLD-SCNC: 132 MMOL/L (ref 132–146)
TEAR DROP CELLS: ABNORMAL
TOTAL PROTEIN: 5.5 G/DL (ref 6.4–8.3)
WBC # BLD: 14.5 E9/L (ref 4.5–11.5)

## 2018-10-07 PROCEDURE — 83735 ASSAY OF MAGNESIUM: CPT

## 2018-10-07 PROCEDURE — 6360000002 HC RX W HCPCS: Performed by: INTERNAL MEDICINE

## 2018-10-07 PROCEDURE — 2060000000 HC ICU INTERMEDIATE R&B

## 2018-10-07 PROCEDURE — 2580000003 HC RX 258: Performed by: INTERNAL MEDICINE

## 2018-10-07 PROCEDURE — 6370000000 HC RX 637 (ALT 250 FOR IP): Performed by: STUDENT IN AN ORGANIZED HEALTH CARE EDUCATION/TRAINING PROGRAM

## 2018-10-07 PROCEDURE — 6370000000 HC RX 637 (ALT 250 FOR IP): Performed by: INTERNAL MEDICINE

## 2018-10-07 PROCEDURE — 71045 X-RAY EXAM CHEST 1 VIEW: CPT

## 2018-10-07 PROCEDURE — 84100 ASSAY OF PHOSPHORUS: CPT

## 2018-10-07 PROCEDURE — 2580000003 HC RX 258

## 2018-10-07 PROCEDURE — 36592 COLLECT BLOOD FROM PICC: CPT

## 2018-10-07 PROCEDURE — 85610 PROTHROMBIN TIME: CPT

## 2018-10-07 PROCEDURE — 36415 COLL VENOUS BLD VENIPUNCTURE: CPT

## 2018-10-07 PROCEDURE — 80053 COMPREHEN METABOLIC PANEL: CPT

## 2018-10-07 PROCEDURE — 85025 COMPLETE CBC W/AUTO DIFF WBC: CPT

## 2018-10-07 PROCEDURE — 94640 AIRWAY INHALATION TREATMENT: CPT

## 2018-10-07 RX ORDER — WARFARIN SODIUM 2.5 MG/1
2.5 TABLET ORAL
Status: COMPLETED | OUTPATIENT
Start: 2018-10-07 | End: 2018-10-07

## 2018-10-07 RX ADMIN — WARFARIN SODIUM 2.5 MG: 2.5 TABLET ORAL at 18:13

## 2018-10-07 RX ADMIN — HYDROCORTISONE SODIUM SUCCINATE 50 MG: 100 INJECTION, POWDER, FOR SOLUTION INTRAMUSCULAR; INTRAVENOUS at 12:41

## 2018-10-07 RX ADMIN — MICONAZOLE NITRATE: 1.42 POWDER TOPICAL at 08:36

## 2018-10-07 RX ADMIN — IPRATROPIUM BROMIDE AND ALBUTEROL SULFATE 1 AMPULE: 2.5; .5 SOLUTION RESPIRATORY (INHALATION) at 11:44

## 2018-10-07 RX ADMIN — WATER 10 ML: 1 INJECTION INTRAMUSCULAR; INTRAVENOUS; SUBCUTANEOUS at 05:36

## 2018-10-07 RX ADMIN — LEVOTHYROXINE SODIUM 50 MCG: 50 TABLET ORAL at 05:28

## 2018-10-07 RX ADMIN — MIDODRINE HYDROCHLORIDE 10 MG: 5 TABLET ORAL at 18:13

## 2018-10-07 RX ADMIN — HYDROCORTISONE SODIUM SUCCINATE 50 MG: 100 INJECTION, POWDER, FOR SOLUTION INTRAMUSCULAR; INTRAVENOUS at 20:34

## 2018-10-07 RX ADMIN — IPRATROPIUM BROMIDE AND ALBUTEROL SULFATE 1 AMPULE: 2.5; .5 SOLUTION RESPIRATORY (INHALATION) at 20:18

## 2018-10-07 RX ADMIN — HYDROCORTISONE SODIUM SUCCINATE 100 MG: 100 INJECTION, POWDER, FOR SOLUTION INTRAMUSCULAR; INTRAVENOUS at 05:28

## 2018-10-07 RX ADMIN — Medication 10 ML: at 08:36

## 2018-10-07 RX ADMIN — ATORVASTATIN CALCIUM 20 MG: 20 TABLET, FILM COATED ORAL at 20:34

## 2018-10-07 RX ADMIN — MIDODRINE HYDROCHLORIDE 10 MG: 5 TABLET ORAL at 08:35

## 2018-10-07 RX ADMIN — PANTOPRAZOLE SODIUM 40 MG: 40 TABLET, DELAYED RELEASE ORAL at 05:28

## 2018-10-07 RX ADMIN — IPRATROPIUM BROMIDE AND ALBUTEROL SULFATE 1 AMPULE: 2.5; .5 SOLUTION RESPIRATORY (INHALATION) at 16:29

## 2018-10-07 RX ADMIN — MIDODRINE HYDROCHLORIDE 10 MG: 5 TABLET ORAL at 12:41

## 2018-10-07 RX ADMIN — Medication 10 ML: at 20:34

## 2018-10-07 RX ADMIN — IPRATROPIUM BROMIDE AND ALBUTEROL SULFATE 1 AMPULE: 2.5; .5 SOLUTION RESPIRATORY (INHALATION) at 08:08

## 2018-10-07 RX ADMIN — IPRATROPIUM BROMIDE AND ALBUTEROL SULFATE 1 AMPULE: 2.5; .5 SOLUTION RESPIRATORY (INHALATION) at 05:34

## 2018-10-07 RX ADMIN — IPRATROPIUM BROMIDE AND ALBUTEROL SULFATE 1 AMPULE: 2.5; .5 SOLUTION RESPIRATORY (INHALATION) at 00:23

## 2018-10-07 ASSESSMENT — PAIN SCALES - GENERAL
PAINLEVEL_OUTOF10: 0

## 2018-10-07 NOTE — PROGRESS NOTES
coagulopathy ([facility]     Lobar pneumonia ([facility]     Encounter for palliative care    Plan:                                    1  ESRD hemodialysis TTS, dialysis today , allow for weaning pressors                                      2 Sepsis , suspected Pneumonia , symptoms better at present ,  blood c/s  Polymicrobial: GPR,                                       GPC; on Vanc and Cefepime                                      3  A fib , warfarin on hold ( coagulopathy ) , recent course of levaquin                                       4  Septic SHOCK  On Pressor support -- weaning in progress ; will check cortisol level                                      5   Anemia, possible blood loss; Blood transfusion  for 1 unit yesterday; H/H stable                   He overall is better , BP improved , off pressors , fair po intake , well dialysed , continue present care        Song Rosales

## 2018-10-07 NOTE — PROGRESS NOTES
Palliative medicine  Review of weekend events    Nephrology following for HD, held due to hypotension requiring pressors but tolerated 10/6 HD  ID following for corynebacterium bacteremia, pt currently on Vancomycin and coagulase-negative staph  ICU able to wean pressor and transfer patient out of the unit, A fib - controlled    No palliative medicine needs identified currently.   We will follow    Erica Thomas PA-C

## 2018-10-07 NOTE — PROGRESS NOTES
[address] [facility]  Department of Internal Medicine  Internal Medicine Residency Program  Resident MICU Progress  Note      Patient:  Charles Gracia 80 y.o. male MRN: [id number]     Date of Service: 10/7/2018    Allergy: Patient has no known allergies. CC: F/u:   Subjective   Mr. María Elena Coon was seen and examined this AM at bedside. He was up in bed eating breakfast. He denies any physical complaints. He states that he feels better over all. feels well  Objective   Physical Exam:  · Vitals: BP (!) 121/41   Pulse 78   Temp 97 °F (36.1 °C) (Temporal)   Resp 18   Ht 5' 7\" (1.702 m)   Wt 209 lb 7 oz (95 kg)   SpO2 96%   BMI 32.80 kg/m²     · I & O - 24hr: In: 7579 [P.O.:357; I.V.:358]  · Out: 1535 [Urine:35]  · General Appearance: alert, appears stated age and cooperative  · HEENT:  Head: Normocephalic, no lesions, without obvious abnormality. · Neck: no adenopathy, no carotid bruit, no JVD and supple, symmetrical, trachea midline  · Lung: clear to auscultation bilaterally  · Heart: regular rate and rhythm, S1, S2 normal, no murmur, click, rub or gallop, irregularly irregular rhythm and S1, S2 normal  · Abdomen: soft, non-tender; bowel sounds normal; no masses,  no organomegaly  · Extremities:  extremities normal, atraumatic, no cyanosis or edema. Right Radial art line, AV fistula on Left arm with palpable thrill  · Musculokeletal: No joint swelling, no muscle tenderness. ROM normal in all joints of extremities.    · Neurologic: Mental status: Alert, oriented, thought content appropriate    Pertinent New Labs & Imaging Studies     CBC:   Lab Results   Component Value Date    WBC 14.5 10/07/2018    RBC 2.63 10/07/2018    HGB 7.8 10/07/2018    HCT 24.5 10/07/2018    MCV 93.2 10/07/2018    MCH 29.7 10/07/2018    MCHC 31.8 10/07/2018    RDW 22.5 10/07/2018     10/07/2018    MPV 11.1 10/07/2018     CBC with Differential:    Lab Results   Component Value Date    WBC 14.5 10/07/2018    RBC 2.63 10/07/2018    HGB

## 2018-10-07 NOTE — PLAN OF CARE
Problem: Falls - Risk of:  Goal: Will remain free from falls  Will remain free from falls   Outcome: Met This Shift      Problem: Cardiac Output - Decreased:  Goal: Hemodynamic stability will improve  Hemodynamic stability will improve   Outcome: Ongoing

## 2018-10-08 LAB
ALBUMIN SERPL-MCNC: 2.7 G/DL (ref 3.5–5.2)
ALP BLD-CCNC: 62 U/L (ref 40–129)
ALT SERPL-CCNC: 5 U/L (ref 0–40)
ANION GAP SERPL CALCULATED.3IONS-SCNC: 14 MMOL/L (ref 7–16)
ANISOCYTOSIS: ABNORMAL
AST SERPL-CCNC: 13 U/L (ref 0–39)
BASOPHILS ABSOLUTE: 0 E9/L (ref 0–0.2)
BASOPHILS RELATIVE PERCENT: 0.1 % (ref 0–2)
BILIRUB SERPL-MCNC: 0.7 MG/DL (ref 0–1.2)
BLOOD CULTURE, ROUTINE: NORMAL
BUN BLDV-MCNC: 37 MG/DL (ref 8–23)
CALCIUM SERPL-MCNC: 8.1 MG/DL (ref 8.6–10.2)
CHLORIDE BLD-SCNC: 92 MMOL/L (ref 98–107)
CO2: 26 MMOL/L (ref 22–29)
CREAT SERPL-MCNC: 6 MG/DL (ref 0.7–1.2)
EOSINOPHILS ABSOLUTE: 0 E9/L (ref 0.05–0.5)
EOSINOPHILS RELATIVE PERCENT: 0 % (ref 0–6)
GFR AFRICAN AMERICAN: 11
GFR NON-AFRICAN AMERICAN: 11 ML/MIN/1.73
GLUCOSE BLD-MCNC: 123 MG/DL (ref 74–109)
HCT VFR BLD CALC: 25 % (ref 37–54)
HEMOGLOBIN: 7.8 G/DL (ref 12.5–16.5)
HYPOCHROMIA: ABNORMAL
INR BLD: 2.6
LYMPHOCYTES ABSOLUTE: 0.49 E9/L (ref 1.5–4)
LYMPHOCYTES RELATIVE PERCENT: 3.5 % (ref 20–42)
MAGNESIUM: 1.8 MG/DL (ref 1.6–2.6)
MCH RBC QN AUTO: 29.5 PG (ref 26–35)
MCHC RBC AUTO-ENTMCNC: 31.2 % (ref 32–34.5)
MCV RBC AUTO: 94.7 FL (ref 80–99.9)
MONOCYTES ABSOLUTE: 0.25 E9/L (ref 0.1–0.95)
MONOCYTES RELATIVE PERCENT: 1.8 % (ref 2–12)
NEUTROPHILS ABSOLUTE: 11.69 E9/L (ref 1.8–7.3)
NEUTROPHILS RELATIVE PERCENT: 94.7 % (ref 43–80)
OVALOCYTES: ABNORMAL
PDW BLD-RTO: 22.4 FL (ref 11.5–15)
PHOSPHORUS: 5.1 MG/DL (ref 2.5–4.5)
PLATELET # BLD: 274 E9/L (ref 130–450)
PMV BLD AUTO: 10.2 FL (ref 7–12)
POIKILOCYTES: ABNORMAL
POLYCHROMASIA: ABNORMAL
POTASSIUM SERPL-SCNC: 4.8 MMOL/L (ref 3.5–5)
PROTHROMBIN TIME: 28.8 SEC (ref 9.3–12.4)
RBC # BLD: 2.64 E12/L (ref 3.8–5.8)
SODIUM BLD-SCNC: 132 MMOL/L (ref 132–146)
TOTAL PROTEIN: 5.7 G/DL (ref 6.4–8.3)
WBC # BLD: 12.3 E9/L (ref 4.5–11.5)

## 2018-10-08 PROCEDURE — 83735 ASSAY OF MAGNESIUM: CPT

## 2018-10-08 PROCEDURE — 36415 COLL VENOUS BLD VENIPUNCTURE: CPT

## 2018-10-08 PROCEDURE — 2580000003 HC RX 258: Performed by: INTERNAL MEDICINE

## 2018-10-08 PROCEDURE — 94640 AIRWAY INHALATION TREATMENT: CPT

## 2018-10-08 PROCEDURE — 6360000002 HC RX W HCPCS: Performed by: INTERNAL MEDICINE

## 2018-10-08 PROCEDURE — 6370000000 HC RX 637 (ALT 250 FOR IP): Performed by: INTERNAL MEDICINE

## 2018-10-08 PROCEDURE — 85025 COMPLETE CBC W/AUTO DIFF WBC: CPT

## 2018-10-08 PROCEDURE — 80053 COMPREHEN METABOLIC PANEL: CPT

## 2018-10-08 PROCEDURE — 6370000000 HC RX 637 (ALT 250 FOR IP): Performed by: STUDENT IN AN ORGANIZED HEALTH CARE EDUCATION/TRAINING PROGRAM

## 2018-10-08 PROCEDURE — 2580000003 HC RX 258

## 2018-10-08 PROCEDURE — 84100 ASSAY OF PHOSPHORUS: CPT

## 2018-10-08 PROCEDURE — 2060000000 HC ICU INTERMEDIATE R&B

## 2018-10-08 PROCEDURE — 2500000003 HC RX 250 WO HCPCS: Performed by: INTERNAL MEDICINE

## 2018-10-08 PROCEDURE — 85610 PROTHROMBIN TIME: CPT

## 2018-10-08 RX ORDER — WARFARIN SODIUM 1 MG/1
1 TABLET ORAL
Status: COMPLETED | OUTPATIENT
Start: 2018-10-08 | End: 2018-10-08

## 2018-10-08 RX ADMIN — IPRATROPIUM BROMIDE AND ALBUTEROL SULFATE 1 AMPULE: 2.5; .5 SOLUTION RESPIRATORY (INHALATION) at 16:09

## 2018-10-08 RX ADMIN — PANTOPRAZOLE SODIUM 40 MG: 40 TABLET, DELAYED RELEASE ORAL at 06:19

## 2018-10-08 RX ADMIN — Medication 10 ML: at 13:19

## 2018-10-08 RX ADMIN — WARFARIN SODIUM 1 MG: 1 TABLET ORAL at 22:18

## 2018-10-08 RX ADMIN — HYDROCORTISONE SODIUM SUCCINATE 50 MG: 100 INJECTION, POWDER, FOR SOLUTION INTRAMUSCULAR; INTRAVENOUS at 13:18

## 2018-10-08 RX ADMIN — MICONAZOLE NITRATE: 1.42 POWDER TOPICAL at 20:02

## 2018-10-08 RX ADMIN — LEVOTHYROXINE SODIUM 50 MCG: 50 TABLET ORAL at 06:19

## 2018-10-08 RX ADMIN — Medication 10 ML: at 20:02

## 2018-10-08 RX ADMIN — IPRATROPIUM BROMIDE AND ALBUTEROL SULFATE 1 AMPULE: 2.5; .5 SOLUTION RESPIRATORY (INHALATION) at 21:08

## 2018-10-08 RX ADMIN — WATER 10 ML: 1 INJECTION INTRAMUSCULAR; INTRAVENOUS; SUBCUTANEOUS at 20:02

## 2018-10-08 RX ADMIN — MICONAZOLE NITRATE: 1.42 POWDER TOPICAL at 13:19

## 2018-10-08 RX ADMIN — IPRATROPIUM BROMIDE AND ALBUTEROL SULFATE 1 AMPULE: 2.5; .5 SOLUTION RESPIRATORY (INHALATION) at 11:02

## 2018-10-08 RX ADMIN — ATORVASTATIN CALCIUM 20 MG: 20 TABLET, FILM COATED ORAL at 20:02

## 2018-10-08 RX ADMIN — MIDODRINE HYDROCHLORIDE 10 MG: 5 TABLET ORAL at 13:19

## 2018-10-08 RX ADMIN — Medication 10 ML: at 08:59

## 2018-10-08 RX ADMIN — HYDROCORTISONE SODIUM SUCCINATE 50 MG: 100 INJECTION, POWDER, FOR SOLUTION INTRAMUSCULAR; INTRAVENOUS at 20:02

## 2018-10-08 RX ADMIN — IPRATROPIUM BROMIDE AND ALBUTEROL SULFATE 1 AMPULE: 2.5; .5 SOLUTION RESPIRATORY (INHALATION) at 04:34

## 2018-10-08 RX ADMIN — HYDROCORTISONE SODIUM SUCCINATE 50 MG: 100 INJECTION, POWDER, FOR SOLUTION INTRAMUSCULAR; INTRAVENOUS at 04:21

## 2018-10-08 RX ADMIN — MIDODRINE HYDROCHLORIDE 10 MG: 5 TABLET ORAL at 08:58

## 2018-10-08 ASSESSMENT — PAIN SCALES - GENERAL: PAINLEVEL_OUTOF10: 0

## 2018-10-08 NOTE — PROGRESS NOTES
(202 to 221) with noted +5.8 L fluid balance since adm  · Ideal Body Wt: 148 lb (67.1 kg), % Ideal Body 149%  · Adjusted Body Wt: 166 lb (75.3 kg), body weight adjusted for Obesity  · BMI Classification: BMI 30.0 - 34.9 Obese Class I     · Comparative Standards (Estimated Nutrition Needs):  · Estimated Daily Total Kcal: 4709-7971 (REE 1653 x 1.2 SF)  · Estimated Daily Protein (g):     Estimated Intake vs Estimated Needs: Intake Improving    Nutrition Risk Level: Low    Nutrition Interventions:   Continue current diet, Continue current ONS  Continued Inpatient Monitoring, Coordination of Care, Swallow Evaluation, Speech Therapy    Nutrition Evaluation:   · Evaluation: Progressing toward goals   · Goals: Pt to consume >50% meals/ONS    · Monitoring: Meal Intake, Supplement Intake, Diet Tolerance, Skin Integrity, Fluid Balance, Ascites/Edema, Weight, Comparative Standards, Pertinent Labs, Chewing/Swallowing    See Adult Nutrition Doc Flowsheet for more detail.      Electronically signed by Ankush Little RD, TALIA on 10/8/18 at 10:14 AM    Contact Number: [phone]

## 2018-10-08 NOTE — PROGRESS NOTES
Last Rate Last Dose    warfarin (COUMADIN) daily dosing (placeholder)   Other RX Placeholder Michael Curry MD        hydrocortisone sodium succinate PF (SOLU-CORTEF) injection 50 mg  50 mg Intravenous Q8H Doyle Roblesdy Koppula   50 mg at 10/08/18 0421    miconazole (MICOTIN) 2 % powder   Topical BID Thanh Ryder MD        pantoprazole (PROTONIX) tablet 40 mg  40 mg Oral QAM AC Sravanthi Rivera MD   40 mg at 10/08/18 5030    vancomycin (VANCOCIN) intermittent dosing (placeholder)   Other RX Placeholder Curtis Heck MD        phenylephrine (FAM-SYNEPHRINE) 50 mg in dextrose 5 % 250 mL infusion  100 mcg/min Intravenous Continuous Thanh Ryder MD   Stopped at 10/07/18 0935    midodrine (PROAMATINE) tablet 10 mg  10 mg Oral TID Thanh Ryder MD   10 mg at 10/08/18 0858    sodium chloride flush 0.9 % injection 10 mL  10 mL Intravenous 2 times per day Paolo Vera MD   10 mL at 10/08/18 0859    sodium chloride flush 0.9 % injection 10 mL  10 mL Intravenous PRN Paolo Vera MD   10 mL at 10/03/18 1302    ondansetron (ZOFRAN) injection 4 mg  4 mg Intravenous Q6H PRN Paolo Vera MD        acetaminophen (TYLENOL) tablet 325 mg  325 mg Oral Q4H PRN Paolo Vera MD        ipratropium-albuterol (DUONEB) nebulizer solution 1 ampule  1 ampule Inhalation Q4H Paolo Vera MD   1 ampule at 10/08/18 0434    atorvastatin (LIPITOR) tablet 20 mg  20 mg Oral Daily Paolo Vera MD   20 mg at 10/07/18 2034    levothyroxine (SYNTHROID) tablet 50 mcg  50 mcg Oral Daily Paolo Vera MD   50 mcg at 10/08/18 0099     PHYSICAL EXAM:  General Appearance:    Lying in bed in no acute distress. Appears comfortable. Head:  Normocephalic atraumatic without obvious abnormality   Throat:  Lips, mucosa, and tongue normal.   Neck:  Supple, symmetrical, trachea midline, no adenopathy;     thyroid:  no enlargement/tenderness/nodules or JVD. Lungs:   Breath sounds CTA.

## 2018-10-08 NOTE — PROGRESS NOTES
0425   NA  130*  132  132   K  3.8  4.0  4.8   CL  90*  91*  92*   CO2  21*  26  26   BUN  48*  22  37*   CREATININE  8.7*  4.6*  6.0*   LABGLOM  7  15  11   GLUCOSE  178*  134*  123*   CALCIUM  7.8*  8.0*  8.1*   ALT  5  5  5   AST  11  13  13   BILITOT  0.6  0.7  0.7   ALKPHOS  65  63  62   MG  1.7  1.8  1.8   PHOS  6.3*  4.0  5.1*       Lab Results   Component Value Date    LABALBU 2.7 (L) 10/08/2018    LABALBU 2.5 (L) 10/07/2018    LABALBU 2.5 (L) 10/06/2018     Lab Results   Component Value Date    TSH 3.870 05/01/2016     No results found for: PHART, PO2ART, JIQ3NCC    Iron Studies:   Lab Results   Component Value Date    IRON 66 05/23/2017    TIBC 94 (L) 05/23/2017    FERRITIN 4,801 05/23/2017         IMPRESSION/RECOMMENDATIONS:      1  ESRD hemodialysis TTS, dialysis today , allow for weaning pressors     2 Sepsis , suspected Pneumonia , symptoms better at present ,  blood c/s  Polymicrobial: GPR,  GPC; on Vanc and Cefepime     3  A fib , warfarin on hold ( coagulopathy ) , recent course of levaquin      4  Septic SHOCK  On Pressor support -- weaning in progress ; will check cortisol level     5   Anemia, possible blood loss; sp prbc                      Chema Mercury.  Ruddy Patterson MD

## 2018-10-09 LAB
ALBUMIN SERPL-MCNC: 2.7 G/DL (ref 3.5–5.2)
ALP BLD-CCNC: 66 U/L (ref 40–129)
ALT SERPL-CCNC: 6 U/L (ref 0–40)
ANION GAP SERPL CALCULATED.3IONS-SCNC: 18 MMOL/L (ref 7–16)
ANISOCYTOSIS: ABNORMAL
AST SERPL-CCNC: 11 U/L (ref 0–39)
BASOPHILS ABSOLUTE: 0 E9/L (ref 0–0.2)
BASOPHILS RELATIVE PERCENT: 0.1 % (ref 0–2)
BILIRUB SERPL-MCNC: 0.7 MG/DL (ref 0–1.2)
BUN BLDV-MCNC: 55 MG/DL (ref 8–23)
CALCIUM SERPL-MCNC: 8.3 MG/DL (ref 8.6–10.2)
CHLORIDE BLD-SCNC: 91 MMOL/L (ref 98–107)
CO2: 24 MMOL/L (ref 22–29)
CREAT SERPL-MCNC: 7.1 MG/DL (ref 0.7–1.2)
EOSINOPHILS ABSOLUTE: 0 E9/L (ref 0.05–0.5)
EOSINOPHILS RELATIVE PERCENT: 0.1 % (ref 0–6)
GFR AFRICAN AMERICAN: 9
GFR NON-AFRICAN AMERICAN: 9 ML/MIN/1.73
GLUCOSE BLD-MCNC: 107 MG/DL (ref 74–109)
HCT VFR BLD CALC: 24.6 % (ref 37–54)
HEMOGLOBIN: 7.7 G/DL (ref 12.5–16.5)
INR BLD: 1.9
LYMPHOCYTES ABSOLUTE: 1.37 E9/L (ref 1.5–4)
LYMPHOCYTES RELATIVE PERCENT: 12.2 % (ref 20–42)
MAGNESIUM: 2 MG/DL (ref 1.6–2.6)
MCH RBC QN AUTO: 29.8 PG (ref 26–35)
MCHC RBC AUTO-ENTMCNC: 31.3 % (ref 32–34.5)
MCV RBC AUTO: 95.3 FL (ref 80–99.9)
MONOCYTES ABSOLUTE: 0.68 E9/L (ref 0.1–0.95)
MONOCYTES RELATIVE PERCENT: 6.1 % (ref 2–12)
NEUTROPHILS ABSOLUTE: 9.35 E9/L (ref 1.8–7.3)
NEUTROPHILS RELATIVE PERCENT: 81.7 % (ref 43–80)
OVALOCYTES: ABNORMAL
PDW BLD-RTO: 22.5 FL (ref 11.5–15)
PLATELET # BLD: 297 E9/L (ref 130–450)
PMV BLD AUTO: 10.4 FL (ref 7–12)
POIKILOCYTES: ABNORMAL
POLYCHROMASIA: ABNORMAL
POTASSIUM SERPL-SCNC: 4.6 MMOL/L (ref 3.5–5)
PROTHROMBIN TIME: 21.8 SEC (ref 9.3–12.4)
RBC # BLD: 2.58 E12/L (ref 3.8–5.8)
SODIUM BLD-SCNC: 133 MMOL/L (ref 132–146)
TOTAL PROTEIN: 5.4 G/DL (ref 6.4–8.3)
VANCOMYCIN RANDOM: 25.5 MCG/ML (ref 5–40)
WBC # BLD: 11.4 E9/L (ref 4.5–11.5)

## 2018-10-09 PROCEDURE — 36415 COLL VENOUS BLD VENIPUNCTURE: CPT

## 2018-10-09 PROCEDURE — G8987 SELF CARE CURRENT STATUS: HCPCS

## 2018-10-09 PROCEDURE — G8988 SELF CARE GOAL STATUS: HCPCS

## 2018-10-09 PROCEDURE — 80053 COMPREHEN METABOLIC PANEL: CPT

## 2018-10-09 PROCEDURE — 2580000003 HC RX 258: Performed by: INTERNAL MEDICINE

## 2018-10-09 PROCEDURE — 6360000002 HC RX W HCPCS: Performed by: NURSE PRACTITIONER

## 2018-10-09 PROCEDURE — 85610 PROTHROMBIN TIME: CPT

## 2018-10-09 PROCEDURE — 85025 COMPLETE CBC W/AUTO DIFF WBC: CPT

## 2018-10-09 PROCEDURE — 6360000002 HC RX W HCPCS: Performed by: INTERNAL MEDICINE

## 2018-10-09 PROCEDURE — G8981 BODY POS CURRENT STATUS: HCPCS

## 2018-10-09 PROCEDURE — 6370000000 HC RX 637 (ALT 250 FOR IP): Performed by: INTERNAL MEDICINE

## 2018-10-09 PROCEDURE — 97162 PT EVAL MOD COMPLEX 30 MIN: CPT

## 2018-10-09 PROCEDURE — 90935 HEMODIALYSIS ONE EVALUATION: CPT

## 2018-10-09 PROCEDURE — 83735 ASSAY OF MAGNESIUM: CPT

## 2018-10-09 PROCEDURE — 80074 ACUTE HEPATITIS PANEL: CPT

## 2018-10-09 PROCEDURE — 2060000000 HC ICU INTERMEDIATE R&B

## 2018-10-09 PROCEDURE — 97165 OT EVAL LOW COMPLEX 30 MIN: CPT

## 2018-10-09 PROCEDURE — G8982 BODY POS GOAL STATUS: HCPCS

## 2018-10-09 PROCEDURE — 80202 ASSAY OF VANCOMYCIN: CPT

## 2018-10-09 PROCEDURE — 97530 THERAPEUTIC ACTIVITIES: CPT

## 2018-10-09 PROCEDURE — 94640 AIRWAY INHALATION TREATMENT: CPT

## 2018-10-09 PROCEDURE — 6370000000 HC RX 637 (ALT 250 FOR IP): Performed by: STUDENT IN AN ORGANIZED HEALTH CARE EDUCATION/TRAINING PROGRAM

## 2018-10-09 RX ORDER — WARFARIN SODIUM 2.5 MG/1
2.5 TABLET ORAL
Status: COMPLETED | OUTPATIENT
Start: 2018-10-09 | End: 2018-10-09

## 2018-10-09 RX ORDER — WARFARIN SODIUM 1 MG/1
1 TABLET ORAL DAILY
Status: DISCONTINUED | OUTPATIENT
Start: 2018-10-09 | End: 2018-10-10 | Stop reason: HOSPADM

## 2018-10-09 RX ADMIN — HYDROCORTISONE SODIUM SUCCINATE 50 MG: 100 INJECTION, POWDER, FOR SOLUTION INTRAMUSCULAR; INTRAVENOUS at 12:31

## 2018-10-09 RX ADMIN — IPRATROPIUM BROMIDE AND ALBUTEROL SULFATE 1 AMPULE: 2.5; .5 SOLUTION RESPIRATORY (INHALATION) at 13:05

## 2018-10-09 RX ADMIN — ATORVASTATIN CALCIUM 20 MG: 20 TABLET, FILM COATED ORAL at 20:27

## 2018-10-09 RX ADMIN — Medication 10 ML: at 10:53

## 2018-10-09 RX ADMIN — IPRATROPIUM BROMIDE AND ALBUTEROL SULFATE 1 AMPULE: 2.5; .5 SOLUTION RESPIRATORY (INHALATION) at 03:58

## 2018-10-09 RX ADMIN — Medication 10 ML: at 20:27

## 2018-10-09 RX ADMIN — HYDROCORTISONE SODIUM SUCCINATE 50 MG: 100 INJECTION, POWDER, FOR SOLUTION INTRAMUSCULAR; INTRAVENOUS at 23:49

## 2018-10-09 RX ADMIN — MICONAZOLE NITRATE: 1.42 POWDER TOPICAL at 20:27

## 2018-10-09 RX ADMIN — VANCOMYCIN HYDROCHLORIDE 1000 MG: 1 INJECTION, POWDER, LYOPHILIZED, FOR SOLUTION INTRAVENOUS at 16:08

## 2018-10-09 RX ADMIN — WARFARIN SODIUM 1 MG: 1 TABLET ORAL at 20:27

## 2018-10-09 RX ADMIN — LEVOTHYROXINE SODIUM 50 MCG: 50 TABLET ORAL at 06:31

## 2018-10-09 RX ADMIN — HYDROCORTISONE SODIUM SUCCINATE 50 MG: 100 INJECTION, POWDER, FOR SOLUTION INTRAMUSCULAR; INTRAVENOUS at 05:01

## 2018-10-09 RX ADMIN — MIDODRINE HYDROCHLORIDE 10 MG: 5 TABLET ORAL at 10:51

## 2018-10-09 RX ADMIN — PANTOPRAZOLE SODIUM 40 MG: 40 TABLET, DELAYED RELEASE ORAL at 06:31

## 2018-10-09 RX ADMIN — Medication 10 ML: at 05:01

## 2018-10-09 RX ADMIN — MICONAZOLE NITRATE: 1.42 POWDER TOPICAL at 10:53

## 2018-10-09 RX ADMIN — WARFARIN SODIUM 2.5 MG: 2.5 TABLET ORAL at 18:54

## 2018-10-09 RX ADMIN — MIDODRINE HYDROCHLORIDE 10 MG: 5 TABLET ORAL at 18:54

## 2018-10-09 RX ADMIN — IPRATROPIUM BROMIDE AND ALBUTEROL SULFATE 1 AMPULE: 2.5; .5 SOLUTION RESPIRATORY (INHALATION) at 08:39

## 2018-10-09 ASSESSMENT — PAIN SCALES - GENERAL
PAINLEVEL_OUTOF10: 0

## 2018-10-09 NOTE — PROGRESS NOTES
Dose    vancomycin 1000 mg IVPB in 250 mL D5W addavial  1,000 mg Intravenous Once per day on Tue Thu Sat Yamileth Baer MD        warfarin (COUMADIN) daily dosing (placeholder)   Other RX Placeholder Heidi Morales MD        hydrocortisone sodium succinate PF (SOLU-CORTEF) injection 50 mg  50 mg Intravenous Q8H Doyle Miranda Koppula   50 mg at 10/09/18 1231    miconazole (MICOTIN) 2 % powder   Topical BID Jonathan Grigsby MD        pantoprazole (PROTONIX) tablet 40 mg  40 mg Oral QAM AC Bennett Velasquez MD   40 mg at 10/09/18 0631    midodrine (PROAMATINE) tablet 10 mg  10 mg Oral TID Jonathan Grigsby MD   10 mg at 10/09/18 1051    sodium chloride flush 0.9 % injection 10 mL  10 mL Intravenous 2 times per day Farooq Stevenson MD   10 mL at 10/09/18 1053    sodium chloride flush 0.9 % injection 10 mL  10 mL Intravenous PRN Farooq Stevenson MD   10 mL at 10/09/18 0501    ondansetron (ZOFRAN) injection 4 mg  4 mg Intravenous Q6H PRN Farooq Stevenson MD        acetaminophen (TYLENOL) tablet 325 mg  325 mg Oral Q4H PRN Farooq Stevenson MD        ipratropium-albuterol (DUONEB) nebulizer solution 1 ampule  1 ampule Inhalation Q4H Farooq Stevenson MD   1 ampule at 10/09/18 1305    atorvastatin (LIPITOR) tablet 20 mg  20 mg Oral Daily Farooq Stevenson MD   20 mg at 10/08/18 2002    levothyroxine (SYNTHROID) tablet 50 mcg  50 mcg Oral Daily Farooq Stevenson MD   50 mcg at 10/09/18 0631     PHYSICAL EXAM    General Appearance: alert, oriented, no acute distress  HEENT: Normocephalic, atraumatic. PERRL, EOM's intact, no pallor or icterus. Neck: Supple, symmetrical, trachea midline. No JVD. Chest wall/Lung: Clear to auscultation bilaterally,  respirations unlabored. No ronchi/wheezing/rales  Heart: Regular rate and rhythm, S1 and S2 normal, no murmur, rub or gallop. Abdomen: Soft, non-tender, bowel sounds normoactive, no masses, no organomegaly  Extremities:  Extremities normal, atraumatic, no cyanosis.   No

## 2018-10-09 NOTE — FLOWSHEET NOTE
10/09/18 1740   Vital Signs   BP (!) 148/81   Temp 97.5 °F (36.4 °C)   Pulse 78   Weight 217 lb 2.5 oz (98.5 kg)   Weight Method Bed scale   Percent Weight Change 0   Post-Hemodialysis Assessment   Post-Treatment Procedures Blood returned; Access bleeding time < 10 minutes   Machine Disinfection Process Acid/Vinegar Clean;Heat Disinfect; Exterior Machine Disinfection   Rinseback Volume (ml) 400 ml   Total Liters Processed (l/min) 70.9 l/min   Dialyzer Clearance Lightly streaked   Duration of Treatment (minutes) 240 minutes   Heparin amount administered during treatment (units) 0 units   Hemodialysis Intake (ml) 300 ml   Hemodialysis Output (ml) 383 ml   NET Removed (ml) 82 ml   Tolerated Treatment Good   Patient Response to Treatment tolerated well, blood returned, needles pulled, sites held, stasis achieved, bandaids applied, +thirll, +bruit

## 2018-10-09 NOTE — PROGRESS NOTES
Pharmacy Consultation Note  (Antibiotic Dosing and Monitoring)    Initial consult date: 10/2  Consulting physician: Bijal Holcomb  Drug(s): Vancomycin  Indication: Septic shock    Ht Readings from Last 1 Encounters:   10/08/18 5' 7\" (1.702 m)     Wt Readings from Last 1 Encounters:   10/09/18 217 lb 2.5 oz (98.5 kg)       Age/  Gender Actual weight IBW DW  Allergy Information   80 y.o. male 93.3 kg  66.1 kg  76.8 kg  Patient has no known allergies.                  Date  WBC BUN/CR Drug/Dose Time   Given Level(s)   (Time) Comments   10/2 20.4 ESRD   Vancomycin 1.5g IV x1 (given 10/1@ 1500) 10/1 @ 1500     10/3  25 ESRD  HD started at 1700  Vancomycin 1250 mg IV x 1   2130 after HD     10/4 19  ESRD No HD today, no vancomycin doses      10/5  18.6  ESRD No HD today      10/6 13.1 HD started at 11:00 ended at 15:00 Vancomycin 1250 mg IV x 1  16:34     10/7 14.5  No HD      10/8 12.3  No HD       10/9 11.4   HD in process started at 14:30 Vancomycin 1000 mg IV x 1 after HD  (1800)           Intake/Output Summary (Last 24 hours) at 10/09/18 1527  Last data filed at 10/09/18 1245   Gross per 24 hour   Intake              300 ml   Output               30 ml   Net              270 ml   40 mL of urine output    Temp max: 98.5  Cultures:    10/3 repeat blood cultures negative to date  10/1 blood cultures both bottles gram positive rods in 1 bottle = corynebacterium, GPC clusters in 1 bottle; coag neg staph   10/1 resp film array negative  10/1 legionella urinary not obtained - no urine output        Assessment:  · 85yo M,hx ESRD, hypotensive, leukocytosis, corynebacterium bacteremia; per ID source AVF  · Hx ESRD, usual HD treatments on TTS, HD in process today  · Goal vancomycin trough 15-20mcg/mL    Plan:  · Vancomycin 1000 mg IV x 1 after HD today; day #8 of therapy  · Pharmacist will follow and monitor/adjust dosing as necessary      Sandie Kaur PharmD, BCPS, BCCCP 10/9/2018 3:27 PM

## 2018-10-09 NOTE — PROGRESS NOTES
[x]  Splinting Needs []    Positioning to improve overall function []  Other: []      Rehab Potential: good    Patient / Family Goal: to return to facility    Patient/family participated in establishment of the following goals:  Short term goals  Time Frame: 3-5 days  1. Pt will demonstrate functional transfers/mobility with MIN A.  2. Pt will demonstrate feeding/grooming with setup. 3. Pt will demonstrate UB ADLs with SBA/setup. 4. Pt will demonstrate LB ADLS with MOD A.  5. Pt will demonstrate good endurance during ADL tasks. Patient and/or family understands diagnosis, prognosis and plan of care: yes    [] Malnutrition indicators have been identified and nursing has been notified to ensure a dietitian consult is ordered.      Time in: 1027  Time out: 1050  Total Tx Time: 23 min, eval + 10 min tx    [place], [place], OTR/L  [phone]

## 2018-10-09 NOTE — PROGRESS NOTES
[facility]  Nephrology Attending Progress Note  Daiva Fabry.  Hannah Sosa MD        SUBJECTIVE:     10/8: no cp or sob today  10/9: feels ok today, no complaints      PROBLEM LIST:    Patient Active Problem List   Diagnosis    HTN (hypertension), benign    Anemia of chronic disease    Mixed hyperlipidemia    Pulmonary hypertension (HCC)    Mild tricuspid regurgitation    Chronic systolic congestive heart failure (HCC)    Paroxysmal atrial fibrillation (HCC)    End stage renal failure on dialysis ([address]    Septic shock (HCC)    Warfarin-induced coagulopathy ([address]    Lobar pneumonia ([address]    Encounter for palliative care        PAST MEDICAL HISTORY:    Past Medical History:   Diagnosis Date    Anemia severe    Arthritis     Atrial fibrillation ([address]     New onset 4/16    Calcaneal spur of both feet     Cardiomyopathy     Cardiomyopathy ([address]     CHF (congestive heart failure) (HCC)     Chronic kidney disease     Combined systolic and diastolic heart failure ([address] 2/24/13 2/24/13- echocardiogram revealed an LVEF of 30-35%, also evidence of stage III diastolic dysfunction, moderate-severe pulmonary hypertension    Depression     Dialysis patient     DVT (deep venous thrombosis) ([address] 8/14    Dysphagia     Failure to thrive (0-17)     Gastrointestinal hemorrhage     Gout     Hemodialysis patient ([address]     Hyperlipidemia     Hypertension     Hypotension of hemodialysis     Lobar pneumonia ([address] 10/1/2018    Peripheral vascular disease     Pulmonary hypertension ([address]     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     UTI (urinary tract infection)     V tach ([address] 4/16     13 beats of nonsustained v tach       DIET:    Dietary Nutrition Supplements: Renal Oral Supplement  DIET RENAL; Dysphagia II Mechanically Altered     PHYSICAL EXAM:     Patient Vitals for the past 24 hrs:   BP Temp Temp src Pulse Resp SpO2 Weight   10/09/18 0811 113/64 98.5 °F (36.9 °C) Oral 76 18 - - 5  5  6   AST  13  13  11   BILITOT  0.7  0.7  0.7   ALKPHOS  63  62  66   MG  1.8  1.8  2.0   PHOS  4.0  5.1*   --        Lab Results   Component Value Date    LABALBU 2.7 (L) 10/09/2018    LABALBU 2.7 (L) 10/08/2018    LABALBU 2.5 (L) 10/07/2018     Lab Results   Component Value Date    TSH 3.870 05/01/2016     No results found for: PHART, PO2ART, [id number]    Iron Studies:   Lab Results   Component Value Date    IRON 66 05/23/2017    TIBC 94 (L) 05/23/2017    FERRITIN 4,801 05/23/2017         IMPRESSION/RECOMMENDATIONS:      1  ESRD hemodialysis TTS, dialysis today , allow for weaning pressors     2 Sepsis , suspected Pneumonia , symptoms better at present ,  blood c/s  Polymicrobial: GPR,  GPC; on Vanc and Cefepime     3  A fib , warfarin on hold ( coagulopathy ) , recent course of levaquin      4  Septic SHOCK  On Pressor support -- weaning in progress ; will check cortisol level     5   Anemia, possible blood loss; sp prbc                      Chema Mercury.  Ruddy Patterson MD

## 2018-10-10 VITALS
DIASTOLIC BLOOD PRESSURE: 55 MMHG | HEIGHT: 67 IN | OXYGEN SATURATION: 96 % | RESPIRATION RATE: 18 BRPM | BODY MASS INDEX: 34.14 KG/M2 | WEIGHT: 217.5 LBS | TEMPERATURE: 97.5 F | SYSTOLIC BLOOD PRESSURE: 107 MMHG | HEART RATE: 79 BPM

## 2018-10-10 LAB
ALBUMIN SERPL-MCNC: 2.5 G/DL (ref 3.5–5.2)
ALP BLD-CCNC: 59 U/L (ref 40–129)
ALT SERPL-CCNC: 7 U/L (ref 0–40)
ANION GAP SERPL CALCULATED.3IONS-SCNC: 15 MMOL/L (ref 7–16)
ANISOCYTOSIS: ABNORMAL
AST SERPL-CCNC: 12 U/L (ref 0–39)
BASOPHILS ABSOLUTE: 0 E9/L (ref 0–0.2)
BASOPHILS RELATIVE PERCENT: 0.1 % (ref 0–2)
BILIRUB SERPL-MCNC: 0.7 MG/DL (ref 0–1.2)
BUN BLDV-MCNC: 25 MG/DL (ref 8–23)
CALCIUM SERPL-MCNC: 7.6 MG/DL (ref 8.6–10.2)
CHLORIDE BLD-SCNC: 97 MMOL/L (ref 98–107)
CO2: 27 MMOL/L (ref 22–29)
CREAT SERPL-MCNC: 4.1 MG/DL (ref 0.7–1.2)
EOSINOPHILS ABSOLUTE: 0.08 E9/L (ref 0.05–0.5)
EOSINOPHILS RELATIVE PERCENT: 0.9 % (ref 0–6)
GFR AFRICAN AMERICAN: 17
GFR NON-AFRICAN AMERICAN: 17 ML/MIN/1.73
GLUCOSE BLD-MCNC: 107 MG/DL (ref 74–109)
HAV IGM SER IA-ACNC: NORMAL
HCT VFR BLD CALC: 26 % (ref 37–54)
HEMOGLOBIN: 8 G/DL (ref 12.5–16.5)
HEPATITIS B CORE IGM ANTIBODY: NORMAL
HEPATITIS B SURFACE ANTIGEN INTERPRETATION: NORMAL
HEPATITIS C ANTIBODY INTERPRETATION: NORMAL
INR BLD: 1.8
LYMPHOCYTES ABSOLUTE: 0.46 E9/L (ref 1.5–4)
LYMPHOCYTES RELATIVE PERCENT: 5.3 % (ref 20–42)
MAGNESIUM: 2 MG/DL (ref 1.6–2.6)
MCH RBC QN AUTO: 29.4 PG (ref 26–35)
MCHC RBC AUTO-ENTMCNC: 30.8 % (ref 32–34.5)
MCV RBC AUTO: 95.6 FL (ref 80–99.9)
MONOCYTES ABSOLUTE: 0.92 E9/L (ref 0.1–0.95)
MONOCYTES RELATIVE PERCENT: 9.7 % (ref 2–12)
NEUTROPHILS ABSOLUTE: 7.73 E9/L (ref 1.8–7.3)
NEUTROPHILS RELATIVE PERCENT: 84.1 % (ref 43–80)
OVALOCYTES: ABNORMAL
PDW BLD-RTO: 22.7 FL (ref 11.5–15)
PLATELET # BLD: 279 E9/L (ref 130–450)
PMV BLD AUTO: 11 FL (ref 7–12)
POIKILOCYTES: ABNORMAL
POLYCHROMASIA: ABNORMAL
POTASSIUM SERPL-SCNC: 4.1 MMOL/L (ref 3.5–5)
PROTHROMBIN TIME: 20.7 SEC (ref 9.3–12.4)
RBC # BLD: 2.72 E12/L (ref 3.8–5.8)
SCHISTOCYTES: ABNORMAL
SODIUM BLD-SCNC: 139 MMOL/L (ref 132–146)
TOTAL PROTEIN: 5.1 G/DL (ref 6.4–8.3)
WBC # BLD: 9.2 E9/L (ref 4.5–11.5)

## 2018-10-10 PROCEDURE — 2580000003 HC RX 258: Performed by: INTERNAL MEDICINE

## 2018-10-10 PROCEDURE — 6370000000 HC RX 637 (ALT 250 FOR IP): Performed by: INTERNAL MEDICINE

## 2018-10-10 PROCEDURE — 85610 PROTHROMBIN TIME: CPT

## 2018-10-10 PROCEDURE — 36415 COLL VENOUS BLD VENIPUNCTURE: CPT

## 2018-10-10 PROCEDURE — 83735 ASSAY OF MAGNESIUM: CPT

## 2018-10-10 PROCEDURE — 94640 AIRWAY INHALATION TREATMENT: CPT

## 2018-10-10 PROCEDURE — 6360000002 HC RX W HCPCS: Performed by: NURSE PRACTITIONER

## 2018-10-10 PROCEDURE — 80053 COMPREHEN METABOLIC PANEL: CPT

## 2018-10-10 PROCEDURE — 85025 COMPLETE CBC W/AUTO DIFF WBC: CPT

## 2018-10-10 RX ORDER — WARFARIN SODIUM 1 MG/1
TABLET ORAL
Qty: 30 TABLET | Refills: 3 | Status: SHIPPED | OUTPATIENT
Start: 2018-10-10 | End: 2018-12-10 | Stop reason: DRUGHIGH

## 2018-10-10 RX ORDER — MIDODRINE HYDROCHLORIDE 10 MG/1
10 TABLET ORAL 3 TIMES DAILY
Qty: 90 TABLET | Refills: 3 | Status: SHIPPED | OUTPATIENT
Start: 2018-10-10

## 2018-10-10 RX ADMIN — MICONAZOLE NITRATE: 1.42 POWDER TOPICAL at 08:25

## 2018-10-10 RX ADMIN — IPRATROPIUM BROMIDE AND ALBUTEROL SULFATE 1 AMPULE: 2.5; .5 SOLUTION RESPIRATORY (INHALATION) at 02:21

## 2018-10-10 RX ADMIN — MIDODRINE HYDROCHLORIDE 10 MG: 5 TABLET ORAL at 12:47

## 2018-10-10 RX ADMIN — HYDROCORTISONE SODIUM SUCCINATE 50 MG: 100 INJECTION, POWDER, FOR SOLUTION INTRAMUSCULAR; INTRAVENOUS at 12:45

## 2018-10-10 RX ADMIN — IPRATROPIUM BROMIDE AND ALBUTEROL SULFATE 1 AMPULE: 2.5; .5 SOLUTION RESPIRATORY (INHALATION) at 12:52

## 2018-10-10 RX ADMIN — LEVOTHYROXINE SODIUM 50 MCG: 50 TABLET ORAL at 07:00

## 2018-10-10 RX ADMIN — IPRATROPIUM BROMIDE AND ALBUTEROL SULFATE 1 AMPULE: 2.5; .5 SOLUTION RESPIRATORY (INHALATION) at 06:27

## 2018-10-10 RX ADMIN — PANTOPRAZOLE SODIUM 40 MG: 40 TABLET, DELAYED RELEASE ORAL at 07:00

## 2018-10-10 RX ADMIN — Medication 10 ML: at 08:25

## 2018-10-10 RX ADMIN — MIDODRINE HYDROCHLORIDE 10 MG: 5 TABLET ORAL at 08:25

## 2018-10-10 ASSESSMENT — PAIN SCALES - GENERAL
PAINLEVEL_OUTOF10: 0
PAINLEVEL_OUTOF10: 0

## 2018-10-26 LAB
EKG ATRIAL RATE: 82 BPM
EKG P AXIS: 31 DEGREES
EKG P-R INTERVAL: 170 MS
EKG Q-T INTERVAL: 440 MS
EKG QRS DURATION: 110 MS
EKG QTC CALCULATION (BAZETT): 514 MS
EKG R AXIS: 4 DEGREES
EKG T AXIS: 25 DEGREES
EKG VENTRICULAR RATE: 82 BPM

## 2018-12-05 ENCOUNTER — APPOINTMENT (OUTPATIENT)
Dept: CT IMAGING | Age: 83
End: 2018-12-05
Payer: MEDICARE

## 2018-12-05 ENCOUNTER — HOSPITAL ENCOUNTER (EMERGENCY)
Age: 83
Discharge: HOME | End: 2018-12-05
Attending: EMERGENCY MEDICINE
Payer: MEDICARE

## 2018-12-05 VITALS
WEIGHT: 217 LBS | HEART RATE: 77 BPM | DIASTOLIC BLOOD PRESSURE: 73 MMHG | TEMPERATURE: 96.6 F | BODY MASS INDEX: 28.76 KG/M2 | OXYGEN SATURATION: 100 % | HEIGHT: 73 IN | SYSTOLIC BLOOD PRESSURE: 128 MMHG | RESPIRATION RATE: 18 BRPM

## 2018-12-05 DIAGNOSIS — S01.01XA LACERATION OF SCALP, INITIAL ENCOUNTER: ICD-10-CM
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
## 2018-12-05 DIAGNOSIS — R55 SYNCOPE AND COLLAPSE: ICD-10-CM

## 2018-12-05 LAB
ACANTHOCYTES: ABNORMAL
ALBUMIN SERPL-MCNC: 3.1 G/DL (ref 3.5–5.2)
ALP BLD-CCNC: 94 U/L (ref 40–129)
ALT SERPL-CCNC: 11 U/L (ref 0–40)
ANION GAP SERPL CALCULATED.3IONS-SCNC: 11 MMOL/L (ref 7–16)
ANISOCYTOSIS: ABNORMAL
AST SERPL-CCNC: 26 U/L (ref 0–39)
BASOPHILS ABSOLUTE: 0 E9/L (ref 0–0.2)
BASOPHILS RELATIVE PERCENT: 0.3 % (ref 0–2)
BILIRUB SERPL-MCNC: 1.3 MG/DL (ref 0–1.2)
BUN BLDV-MCNC: 15 MG/DL (ref 8–23)
BURR CELLS: ABNORMAL
CALCIUM SERPL-MCNC: 8.6 MG/DL (ref 8.6–10.2)
CHLORIDE BLD-SCNC: 100 MMOL/L (ref 98–107)
CO2: 34 MMOL/L (ref 22–29)
CREAT SERPL-MCNC: 5.3 MG/DL (ref 0.7–1.2)
EKG ATRIAL RATE: 71 BPM
EKG P AXIS: 66 DEGREES
EKG P-R INTERVAL: 180 MS
EKG Q-T INTERVAL: 420 MS
EKG QRS DURATION: 110 MS
EKG QTC CALCULATION (BAZETT): 456 MS
EKG R AXIS: 41 DEGREES
EKG T AXIS: -79 DEGREES
EKG VENTRICULAR RATE: 71 BPM
EOSINOPHILS ABSOLUTE: 0 E9/L (ref 0.05–0.5)
EOSINOPHILS RELATIVE PERCENT: 1.6 % (ref 0–6)
GFR AFRICAN AMERICAN: 13
GFR NON-AFRICAN AMERICAN: 13 ML/MIN/1.73
GLUCOSE BLD-MCNC: 87 MG/DL (ref 74–99)
HCT VFR BLD CALC: 34.2 % (ref 37–54)
HEMOGLOBIN: 10.5 G/DL (ref 12.5–16.5)
INR BLD: 2.7
LYMPHOCYTES ABSOLUTE: 1.34 E9/L (ref 1.5–4)
LYMPHOCYTES RELATIVE PERCENT: 21.7 % (ref 20–42)
MCH RBC QN AUTO: 30 PG (ref 26–35)
MCHC RBC AUTO-ENTMCNC: 30.7 % (ref 32–34.5)
MCV RBC AUTO: 97.7 FL (ref 80–99.9)
MONOCYTES ABSOLUTE: 0.3 E9/L (ref 0.1–0.95)
MONOCYTES RELATIVE PERCENT: 5.2 % (ref 2–12)
NEUTROPHILS ABSOLUTE: 4.45 E9/L (ref 1.8–7.3)
NEUTROPHILS RELATIVE PERCENT: 73 % (ref 43–80)
OVALOCYTES: ABNORMAL
PDW BLD-RTO: 20.9 FL (ref 11.5–15)
PLATELET # BLD: 202 E9/L (ref 130–450)
PMV BLD AUTO: 12 FL (ref 7–12)
POIKILOCYTES: ABNORMAL
POTASSIUM SERPL-SCNC: 3.2 MMOL/L (ref 3.5–5)
PROTHROMBIN TIME: 30.2 SEC (ref 9.3–12.4)
RBC # BLD: 3.5 E12/L (ref 3.8–5.8)
SCHISTOCYTES: ABNORMAL
SODIUM BLD-SCNC: 145 MMOL/L (ref 132–146)
TOTAL PROTEIN: 5.9 G/DL (ref 6.4–8.3)
WBC # BLD: 6.1 E9/L (ref 4.5–11.5)

## 2018-12-05 PROCEDURE — 80053 COMPREHEN METABOLIC PANEL: CPT

## 2018-12-05 PROCEDURE — 85610 PROTHROMBIN TIME: CPT

## 2018-12-05 PROCEDURE — 93005 ELECTROCARDIOGRAM TRACING: CPT

## 2018-12-05 PROCEDURE — 72125 CT NECK SPINE W/O DYE: CPT

## 2018-12-05 PROCEDURE — 70450 CT HEAD/BRAIN W/O DYE: CPT

## 2018-12-05 PROCEDURE — 6370000000 HC RX 637 (ALT 250 FOR IP): Performed by: EMERGENCY MEDICINE

## 2018-12-05 PROCEDURE — 36415 COLL VENOUS BLD VENIPUNCTURE: CPT

## 2018-12-05 PROCEDURE — 85025 COMPLETE CBC W/AUTO DIFF WBC: CPT

## 2018-12-05 PROCEDURE — 12002 RPR S/N/AX/GEN/TRNK2.6-7.5CM: CPT

## 2018-12-05 PROCEDURE — 99284 EMERGENCY DEPT VISIT MOD MDM: CPT

## 2018-12-05 RX ORDER — POTASSIUM BICARBONATE 25 MEQ/1
50 TABLET, EFFERVESCENT ORAL ONCE
Status: COMPLETED | OUTPATIENT
Start: 2018-12-05 | End: 2018-12-05

## 2018-12-05 RX ORDER — DIAPER,BRIEF,INFANT-TODD,DISP
EACH MISCELLANEOUS ONCE
Status: DISCONTINUED | OUTPATIENT
Start: 2018-12-05 | End: 2018-12-05 | Stop reason: HOSPADM

## 2018-12-05 RX ORDER — 0.9 % SODIUM CHLORIDE 0.9 %
1000 INTRAVENOUS SOLUTION INTRAVENOUS ONCE
Status: DISCONTINUED | OUTPATIENT
Start: 2018-12-05 | End: 2018-12-05 | Stop reason: HOSPADM

## 2018-12-05 RX ORDER — ACETAMINOPHEN 325 MG/1
650 TABLET ORAL ONCE
Status: DISCONTINUED | OUTPATIENT
Start: 2018-12-05 | End: 2018-12-05 | Stop reason: HOSPADM

## 2018-12-05 RX ADMIN — POTASSIUM BICARBONATE 50 MEQ: 25 TABLET, EFFERVESCENT ORAL at 17:07

## 2018-12-05 ASSESSMENT — ENCOUNTER SYMPTOMS
VOMITING: 0
BACK PAIN: 0
DIARRHEA: 0
SINUS PRESSURE: 0
EYE PAIN: 0
EYE REDNESS: 0
NAUSEA: 0
ABDOMINAL PAIN: 0
WHEEZING: 0
COUGH: 0
SHORTNESS OF BREATH: 0
EYE DISCHARGE: 0
SORE THROAT: 0

## 2018-12-05 ASSESSMENT — PAIN DESCRIPTION - FREQUENCY: FREQUENCY: CONTINUOUS

## 2018-12-05 ASSESSMENT — PAIN DESCRIPTION - LOCATION: LOCATION: NECK

## 2018-12-05 ASSESSMENT — PAIN DESCRIPTION - PAIN TYPE: TYPE: ACUTE PAIN

## 2018-12-05 ASSESSMENT — PAIN SCALES - GENERAL: PAINLEVEL_OUTOF10: 9

## 2018-12-05 NOTE — ED PROVIDER NOTES
(Left, 12/29/2017); and other surgical history (04/23/2018). Social History:  reports that he has never smoked. He has never used smokeless tobacco. He reports that he does not drink alcohol or use drugs. Family History: family history is not on file. The patients home medications have been reviewed. Allergies: Patient has no known allergies.     -------------------------------------------------- RESULTS -------------------------------------------------  Labs:  Results for orders placed or performed during the hospital encounter of 12/05/18   CBC Auto Differential   Result Value Ref Range    WBC 6.1 4.5 - 11.5 E9/L    RBC 3.50 (L) 3.80 - 5.80 E12/L    Hemoglobin 10.5 (L) 12.5 - 16.5 g/dL    Hematocrit 34.2 (L) 37.0 - 54.0 %    MCV 97.7 80.0 - 99.9 fL    MCH 30.0 26.0 - 35.0 pg    MCHC 30.7 (L) 32.0 - 34.5 %    RDW 20.9 (H) 11.5 - 15.0 fL    Platelets 609 062 - 109 E9/L    MPV 12.0 7.0 - 12.0 fL    Neutrophils % 73.0 43.0 - 80.0 %    Lymphocytes % 21.7 20.0 - 42.0 %    Monocytes % 5.2 2.0 - 12.0 %    Eosinophils % 1.6 0.0 - 6.0 %    Basophils % 0.3 0.0 - 2.0 %    Neutrophils # 4.45 1.80 - 7.30 E9/L    Lymphocytes # 1.34 (L) 1.50 - 4.00 E9/L    Monocytes # 0.30 0.10 - 0.95 E9/L    Eosinophils # 0.00 (L) 0.05 - 0.50 E9/L    Basophils # 0.00 0.00 - 0.20 E9/L    Anisocytosis 2+     Poikilocytes 3+     Schistocytes 1+     Acanthocytes 1+     Higgins Cells 1+     Ovalocytes 3+    Comprehensive Metabolic Panel   Result Value Ref Range    Sodium 145 132 - 146 mmol/L    Potassium 3.2 (L) 3.5 - 5.0 mmol/L    Chloride 100 98 - 107 mmol/L    CO2 34 (H) 22 - 29 mmol/L    Anion Gap 11 7 - 16 mmol/L    Glucose 87 74 - 99 mg/dL    BUN 15 8 - 23 mg/dL    CREATININE 5.3 (H) 0.7 - 1.2 mg/dL    GFR Non-African American 13 >=60 mL/min/1.73    GFR African American 13     Calcium 8.6 8.6 - 10.2 mg/dL    Total Protein 5.9 (L) 6.4 - 8.3 g/dL    Alb 3.1 (L) 3.5 - 5.2 g/dL    Total Bilirubin 1.3 (H) 0.0 - 1.2 mg/dL    Alkaline Phosphatase 94 40 - 129 U/L    ALT 11 0 - 40 U/L    AST 26 0 - 39 U/L   Protime-INR   Result Value Ref Range    Protime 30.2 (H) 9.3 - 12.4 sec    INR 2.7    EKG 12 Lead   Result Value Ref Range    Ventricular Rate 71 BPM    Atrial Rate 71 BPM    P-R Interval 180 ms    QRS Duration 110 ms    Q-T Interval 420 ms    QTc Calculation (Bazett) 456 ms    P Axis 66 degrees    R Axis 41 degrees    T Axis -79 degrees       Radiology:  CT Cervical Spine WO Contrast   Final Result      CT Head WO Contrast   Final Result   Frontal scalp subgaleal hematoma. Mild mucosal thickening of the right maxillary sinus which may   represent sinusitis in the appropriate clinical setting. Age-appropriate diffuse atrophy with microangiopathic changes. No intracranial hemorrhage or signs of ischemia which may go   undetected in the hyperacute phase.             ------------------------- NURSING NOTES AND VITALS REVIEWED ---------------------------  Date / Time Roomed:  12/5/2018 12:20 PM  ED Bed Assignment:  14A/14A-14    The nursing notes within the ED encounter and vital signs as below have been reviewed. /73   Pulse 77   Temp 96.6 °F (35.9 °C) (Temporal)   Resp 18   Ht 6' 1\" (1.854 m)   Wt 217 lb (98.4 kg)   SpO2 100%   BMI 28.63 kg/m²   Oxygen Saturation Interpretation: Normal      ------------------------------------------ PROGRESS NOTES ------------------------------------------         4:13 PM  I have spoken with the patient and discussed todays results, in addition to providing specific details for the plan of care and counseling regarding the diagnosis and prognosis. Their questions are answered at this time and they are agreeable with the plan. I discussed at length with them reasons for immediate return here for re evaluation. They will followup with their primary care physician by calling their office tomorrow.       --------------------------------- ADDITIONAL PROVIDER NOTES

## 2018-12-05 NOTE — ED NOTES
Bed: 14A-14  Expected date:   Expected time:   Means of arrival:   Comments:  ems     Marcus Reza RN  12/05/18 [id number]

## 2018-12-10 ENCOUNTER — APPOINTMENT (OUTPATIENT)
Dept: GENERAL RADIOLOGY | Age: 83
DRG: 082 | End: 2018-12-10
Payer: MEDICARE

## 2018-12-10 ENCOUNTER — APPOINTMENT (OUTPATIENT)
Dept: CT IMAGING | Age: 83
DRG: 082 | End: 2018-12-10
Payer: MEDICARE

## 2018-12-10 ENCOUNTER — HOSPITAL ENCOUNTER (INPATIENT)
Age: 83
LOS: 7 days | Discharge: NURSING, REHAB OR LONG-TERM CARE | DRG: 082 | End: 2018-12-17
Attending: EMERGENCY MEDICINE | Admitting: INTERNAL MEDICINE
Payer: MEDICARE

## 2018-12-10 DIAGNOSIS — R11.2 NAUSEA VOMITING AND DIARRHEA: ICD-10-CM

## 2018-12-10 DIAGNOSIS — R19.7 NAUSEA VOMITING AND DIARRHEA: ICD-10-CM

## 2018-12-10 DIAGNOSIS — R55 SYNCOPE AND COLLAPSE: Primary | ICD-10-CM

## 2018-12-10 DIAGNOSIS — E86.0 DEHYDRATION: ICD-10-CM

## 2018-12-10 PROBLEM — R65.21 SEPTIC SHOCK (HCC): Status: RESOLVED | Noted: 2018-10-01 | Resolved: 2018-12-10

## 2018-12-10 PROBLEM — S06.5XAA SUBDURAL HEMATOMA (HCC): Status: ACTIVE | Noted: 2018-12-10

## 2018-12-10 PROBLEM — D68.32 WARFARIN-INDUCED COAGULOPATHY (HCC): Status: RESOLVED | Noted: 2018-10-01 | Resolved: 2018-12-10

## 2018-12-10 PROBLEM — T45.515A WARFARIN-INDUCED COAGULOPATHY (HCC): Status: RESOLVED | Noted: 2018-10-01 | Resolved: 2018-12-10

## 2018-12-10 PROBLEM — A41.9 SEPTIC SHOCK (HCC): Status: RESOLVED | Noted: 2018-10-01 | Resolved: 2018-12-10

## 2018-12-10 PROBLEM — J18.1 LOBAR PNEUMONIA (HCC): Status: RESOLVED | Noted: 2018-10-01 | Resolved: 2018-12-10

## 2018-12-10 LAB
ALBUMIN SERPL-MCNC: 2.8 G/DL (ref 3.5–5.2)
ALP BLD-CCNC: 87 U/L (ref 40–129)
ALT SERPL-CCNC: 9 U/L (ref 0–40)
ANION GAP SERPL CALCULATED.3IONS-SCNC: 13 MMOL/L (ref 7–16)
ANISOCYTOSIS: ABNORMAL
APTT: 57.1 SEC (ref 24.5–35.1)
AST SERPL-CCNC: 20 U/L (ref 0–39)
BASOPHILS ABSOLUTE: 0 E9/L (ref 0–0.2)
BASOPHILS RELATIVE PERCENT: 0.5 % (ref 0–2)
BILIRUB SERPL-MCNC: 1.7 MG/DL (ref 0–1.2)
BUN BLDV-MCNC: 24 MG/DL (ref 8–23)
CALCIUM SERPL-MCNC: 8.2 MG/DL (ref 8.6–10.2)
CHLORIDE BLD-SCNC: 99 MMOL/L (ref 98–107)
CO2: 30 MMOL/L (ref 22–29)
CREAT SERPL-MCNC: 6.9 MG/DL (ref 0.7–1.2)
EKG ATRIAL RATE: 92 BPM
EKG P AXIS: 56 DEGREES
EKG P-R INTERVAL: 166 MS
EKG Q-T INTERVAL: 416 MS
EKG QRS DURATION: 122 MS
EKG QTC CALCULATION (BAZETT): 514 MS
EKG R AXIS: 88 DEGREES
EKG T AXIS: -92 DEGREES
EKG VENTRICULAR RATE: 92 BPM
EOSINOPHILS ABSOLUTE: 0.11 E9/L (ref 0.05–0.5)
EOSINOPHILS RELATIVE PERCENT: 1.8 % (ref 0–6)
GFR AFRICAN AMERICAN: 10
GFR AFRICAN AMERICAN: 9
GFR NON-AFRICAN AMERICAN: 8 ML/MIN/1.73
GFR NON-AFRICAN AMERICAN: 9 ML/MIN/1.73
GLUCOSE BLD-MCNC: 96 MG/DL (ref 74–99)
GLUCOSE BLD-MCNC: 97 MG/DL (ref 74–99)
HCT VFR BLD CALC: 32.2 % (ref 37–54)
HEMOGLOBIN: 9.9 G/DL (ref 12.5–16.5)
INR BLD: 3.8
LACTIC ACID: 2.4 MMOL/L (ref 0.5–2.2)
LIPASE: 16 U/L (ref 13–60)
LYMPHOCYTES ABSOLUTE: 1.08 E9/L (ref 1.5–4)
LYMPHOCYTES RELATIVE PERCENT: 18.4 % (ref 20–42)
MAGNESIUM: 1.8 MG/DL (ref 1.6–2.6)
MCH RBC QN AUTO: 30.5 PG (ref 26–35)
MCHC RBC AUTO-ENTMCNC: 30.7 % (ref 32–34.5)
MCV RBC AUTO: 99.1 FL (ref 80–99.9)
METAMYELOCYTES RELATIVE PERCENT: 0.9 % (ref 0–1)
MONOCYTES ABSOLUTE: 0.36 E9/L (ref 0.1–0.95)
MONOCYTES RELATIVE PERCENT: 6.1 % (ref 2–12)
NEUTROPHILS ABSOLUTE: 4.44 E9/L (ref 1.8–7.3)
NEUTROPHILS RELATIVE PERCENT: 72.8 % (ref 43–80)
OVALOCYTES: ABNORMAL
PDW BLD-RTO: 22 FL (ref 11.5–15)
PLATELET # BLD: 238 E9/L (ref 130–450)
PMV BLD AUTO: 11.6 FL (ref 7–12)
POC CHLORIDE: 104 MMOL/L (ref 100–108)
POC CREATININE: 6.7 MG/DL (ref 0.7–1.2)
POC POTASSIUM: 2.8 MMOL/L (ref 3.5–5)
POC SODIUM: 143 MMOL/L (ref 132–146)
POIKILOCYTES: ABNORMAL
POTASSIUM REFLEX MAGNESIUM: 3 MMOL/L (ref 3.5–5)
PROTHROMBIN TIME: 42.5 SEC (ref 9.3–12.4)
RBC # BLD: 3.25 E12/L (ref 3.8–5.8)
SODIUM BLD-SCNC: 142 MMOL/L (ref 132–146)
TOTAL PROTEIN: 5.8 G/DL (ref 6.4–8.3)
TROPONIN: 0.34 NG/ML (ref 0–0.03)
TROPONIN: 0.38 NG/ML (ref 0–0.03)
WBC # BLD: 6 E9/L (ref 4.5–11.5)

## 2018-12-10 PROCEDURE — 2060000000 HC ICU INTERMEDIATE R&B

## 2018-12-10 PROCEDURE — 70450 CT HEAD/BRAIN W/O DYE: CPT

## 2018-12-10 PROCEDURE — 82435 ASSAY OF BLOOD CHLORIDE: CPT

## 2018-12-10 PROCEDURE — 99285 EMERGENCY DEPT VISIT HI MDM: CPT

## 2018-12-10 PROCEDURE — 83735 ASSAY OF MAGNESIUM: CPT

## 2018-12-10 PROCEDURE — 6370000000 HC RX 637 (ALT 250 FOR IP): Performed by: EMERGENCY MEDICINE

## 2018-12-10 PROCEDURE — 83605 ASSAY OF LACTIC ACID: CPT

## 2018-12-10 PROCEDURE — 80053 COMPREHEN METABOLIC PANEL: CPT

## 2018-12-10 PROCEDURE — 85610 PROTHROMBIN TIME: CPT

## 2018-12-10 PROCEDURE — 2580000003 HC RX 258: Performed by: INTERNAL MEDICINE

## 2018-12-10 PROCEDURE — 99223 1ST HOSP IP/OBS HIGH 75: CPT | Performed by: INTERNAL MEDICINE

## 2018-12-10 PROCEDURE — 87186 SC STD MICRODIL/AGAR DIL: CPT

## 2018-12-10 PROCEDURE — 85025 COMPLETE CBC W/AUTO DIFF WBC: CPT

## 2018-12-10 PROCEDURE — 82565 ASSAY OF CREATININE: CPT

## 2018-12-10 PROCEDURE — 84295 ASSAY OF SERUM SODIUM: CPT

## 2018-12-10 PROCEDURE — 82947 ASSAY GLUCOSE BLOOD QUANT: CPT

## 2018-12-10 PROCEDURE — 84484 ASSAY OF TROPONIN QUANT: CPT

## 2018-12-10 PROCEDURE — 71045 X-RAY EXAM CHEST 1 VIEW: CPT

## 2018-12-10 PROCEDURE — 87149 DNA/RNA DIRECT PROBE: CPT

## 2018-12-10 PROCEDURE — 93005 ELECTROCARDIOGRAM TRACING: CPT

## 2018-12-10 PROCEDURE — 84132 ASSAY OF SERUM POTASSIUM: CPT

## 2018-12-10 PROCEDURE — 36415 COLL VENOUS BLD VENIPUNCTURE: CPT

## 2018-12-10 PROCEDURE — 83690 ASSAY OF LIPASE: CPT

## 2018-12-10 PROCEDURE — 87077 CULTURE AEROBIC IDENTIFY: CPT

## 2018-12-10 PROCEDURE — 85730 THROMBOPLASTIN TIME PARTIAL: CPT

## 2018-12-10 PROCEDURE — 87040 BLOOD CULTURE FOR BACTERIA: CPT

## 2018-12-10 RX ORDER — SODIUM CHLORIDE 0.9 % (FLUSH) 0.9 %
10 SYRINGE (ML) INJECTION PRN
Status: DISCONTINUED | OUTPATIENT
Start: 2018-12-10 | End: 2018-12-17 | Stop reason: HOSPADM

## 2018-12-10 RX ORDER — ACETAMINOPHEN 325 MG/1
650 TABLET ORAL EVERY 8 HOURS PRN
Status: DISCONTINUED | OUTPATIENT
Start: 2018-12-10 | End: 2018-12-17 | Stop reason: HOSPADM

## 2018-12-10 RX ORDER — MIDODRINE HYDROCHLORIDE 5 MG/1
10 TABLET ORAL 3 TIMES DAILY
Status: DISCONTINUED | OUTPATIENT
Start: 2018-12-10 | End: 2018-12-17 | Stop reason: HOSPADM

## 2018-12-10 RX ORDER — ONDANSETRON 2 MG/ML
4 INJECTION INTRAMUSCULAR; INTRAVENOUS EVERY 6 HOURS PRN
Status: DISCONTINUED | OUTPATIENT
Start: 2018-12-10 | End: 2018-12-17 | Stop reason: HOSPADM

## 2018-12-10 RX ORDER — WARFARIN SODIUM 2 MG/1
2 TABLET ORAL DAILY
Status: ON HOLD | COMMUNITY
End: 2018-12-17 | Stop reason: HOSPADM

## 2018-12-10 RX ORDER — ALBUTEROL SULFATE 2.5 MG/3ML
2.5 SOLUTION RESPIRATORY (INHALATION) EVERY 6 HOURS PRN
Status: DISCONTINUED | OUTPATIENT
Start: 2018-12-10 | End: 2018-12-17 | Stop reason: HOSPADM

## 2018-12-10 RX ORDER — SODIUM CHLORIDE 0.9 % (FLUSH) 0.9 %
10 SYRINGE (ML) INJECTION EVERY 12 HOURS SCHEDULED
Status: DISCONTINUED | OUTPATIENT
Start: 2018-12-10 | End: 2018-12-17 | Stop reason: HOSPADM

## 2018-12-10 RX ORDER — ALLOPURINOL 100 MG/1
100 TABLET ORAL DAILY
Status: DISCONTINUED | OUTPATIENT
Start: 2018-12-10 | End: 2018-12-17 | Stop reason: HOSPADM

## 2018-12-10 RX ORDER — SODIUM CHLORIDE 9 MG/ML
INJECTION, SOLUTION INTRAVENOUS CONTINUOUS
Status: ACTIVE | OUTPATIENT
Start: 2018-12-10 | End: 2018-12-11

## 2018-12-10 RX ORDER — PHYTONADIONE 5 MG/1
10 TABLET ORAL ONCE
Status: COMPLETED | OUTPATIENT
Start: 2018-12-10 | End: 2018-12-10

## 2018-12-10 RX ORDER — LEVOTHYROXINE SODIUM 0.05 MG/1
50 TABLET ORAL DAILY
Status: DISCONTINUED | OUTPATIENT
Start: 2018-12-10 | End: 2018-12-17 | Stop reason: HOSPADM

## 2018-12-10 RX ORDER — ATORVASTATIN CALCIUM 20 MG/1
20 TABLET, FILM COATED ORAL DAILY
Status: DISCONTINUED | OUTPATIENT
Start: 2018-12-10 | End: 2018-12-17 | Stop reason: HOSPADM

## 2018-12-10 RX ADMIN — PHYTONADIONE 10 MG: 5 TABLET ORAL at 14:35

## 2018-12-10 RX ADMIN — Medication 10 ML: at 22:03

## 2018-12-10 RX ADMIN — SODIUM CHLORIDE: 9 INJECTION, SOLUTION INTRAVENOUS at 22:02

## 2018-12-10 ASSESSMENT — PAIN DESCRIPTION - PAIN TYPE: TYPE: ACUTE PAIN

## 2018-12-10 ASSESSMENT — PAIN DESCRIPTION - DESCRIPTORS: DESCRIPTORS: SHARP

## 2018-12-10 ASSESSMENT — ENCOUNTER SYMPTOMS
WHEEZING: 0
COUGH: 0
DIARRHEA: 1
EYE PAIN: 0
SHORTNESS OF BREATH: 0
EYE REDNESS: 0
VOMITING: 1
EYE DISCHARGE: 0
SINUS PRESSURE: 0
ABDOMINAL PAIN: 0
NAUSEA: 1
SORE THROAT: 0
BACK PAIN: 0

## 2018-12-10 ASSESSMENT — PAIN DESCRIPTION - LOCATION: LOCATION: ABDOMEN;NECK

## 2018-12-10 ASSESSMENT — PAIN SCALES - GENERAL: PAINLEVEL_OUTOF10: 8

## 2018-12-10 ASSESSMENT — PAIN DESCRIPTION - FREQUENCY: FREQUENCY: CONTINUOUS

## 2018-12-10 NOTE — CONSULTS
in 8 of 14/status post IVC filter  11. Nonsustained ventricular tachycardia in the setting of sepsis and acute renal failure April 26, 2016  12. Noncompliance  13. Syncope in May 2010  14. Diabetes     MEDICAL HISTORY:  1. As above    2. Gout    3. ESRD on HD: M, W, F                          A. Creation of left brachiocephalic AV fistula (23/04/9259)                          B. Left brachiocephalic AV fistulogram (0/48/3797)                          C. Left arm AVF with sepsis corynebacterium, coag-negative staph bacteremia (treated with IV ATB (10/2018). 4. Anemia of chronic disease  5. EGD April 2014 gastritis and small gastric ulcer    6. Bone marrow aspiration May 2014    7. April 14, 2016 endoscopy hiatal hernia chronic gastritis type I ulcer  8. H. Pylori  9. History of alcohol abuse  10. History of gallbladder disease  11. DNR-CCA status              Medications Prior to Admit:  Home Medications           Prior to Admission medications    Medication Sig Start Date End Date Taking?  Authorizing Provider   warfarin (COUMADIN) 2 MG tablet Take 2 mg by mouth daily     Yes Historical Provider, MD   midodrine (PROAMATINE) 10 MG tablet Take 1 tablet by mouth 3 times daily 10/10/18   Yes Faviola Miller MD   albuterol (PROVENTIL) (2.5 MG/3ML) 0.083% nebulizer solution Take 2.5 mg by nebulization every 6 hours as needed for Shortness of Breath     Yes Historical Provider, MD   acetaminophen (ARTHRITIS PAIN RELIEF) 650 MG extended release tablet Take 650 mg by mouth every 8 hours as needed for Pain     Yes Historical Provider, MD   benzocaine (CEPACOL) 10 MG LOZG Take 1 lozenge by mouth every 4 hours as needed      Yes Historical Provider, MD   magnesium hydroxide (MILK OF MAGNESIA) 400 MG/5ML suspension Take 30 mLs by mouth daily as needed for Constipation      Yes Historical Provider, MD   allopurinol (ZYLOPRIM) 100 MG tablet Take 100 mg by mouth daily     Yes Historical Provider, MD   atorvastatin (LIPITOR) 20 MG tablet Take 20 mg by mouth daily     Yes Historical Provider, MD   loperamide (IMODIUM) 2 MG capsule Take 2 mg by mouth every 12 hours as needed for Diarrhea      Yes Historical Provider, MD   ondansetron (ZOFRAN) 4 MG tablet Take 4 mg by mouth every 6 hours as needed for Nausea or Vomiting     Yes Historical Provider, MD   levothyroxine (SYNTHROID) 50 MCG tablet Take 50 mcg by mouth Daily     Yes Historical Provider, MD            Current Medications:    Current Hospital Medications   No current facility-administered medications for this encounter.         Allergies:  Patient has no known allergies.     Social History:    Lives alone. Denies alcohol use, tobacco use and illicit drug use.      Family History: Noncontributory due to advanced age.      Review of Systems:   Difficult to obtain full review of systems. He report vomiting, diarrhea, falls, and syncope as above. According to records from the [facility], he has been lethargic.      Physical Exam:   BP (!) 88/53   Pulse 71   Temp 97.8 °F (36.6 °C) (Oral)   Resp 16   Ht 6' 1\" (1.854 m)   Wt 217 lb (98.4 kg)   SpO2 90%   BMI 28.63 kg/m²   CONST:  Well developed, well nourished elderly male who appears of stated age. Awake, alert and cooperative. Sick looking but in no acute distress. HEENT:   Head- Normocephalic, atraumatic   Eyes- Conjunctivae pink, anicteric  Throat- Oral mucosa pink and dry  Neck-  No stridor, no jugular venous distention or carotid bruit. CHEST: Chest symmetrical and non-tender to palpation. No accessory muscle use or intercostal retractions  RESPIRATORY: Lung sounds - clear but diminished  CARDIOVASCULAR:     Heart Inspection- shows no noted pulsations  Heart Palpation- no heaves or thrills; PMI is non-displaced   Heart Ausculation- Regular rate and rhythm, no murmur, s3, s4 or rub   PV: No lower extremity edema. No varicosities. Feet warm but pedal pulses not palpable. +LUE AVF-+thrill/+bruit.    ABDOMEN: Soft, non-tender to light

## 2018-12-10 NOTE — CONSULTS
Patient seen and examined. Chart, labs, imaging studies, EKG and rhythm strips reviewed. Full consult to follow. We are asked to see the patient due to syncope       Physical Exam:   BP (!) 88/53   Pulse 71   Temp 97.8 °F (36.6 °C) (Oral)   Resp 16   Ht 6' 1\" (1.854 m)   Wt 217 lb (98.4 kg)   SpO2 90%   BMI 28.63 kg/m²   CONST:  Well developed, well nourished elderly male who appears of stated age. Awake, alert and cooperative. Sick looking but in no acute distress. HEENT:   Head- Normocephalic, atraumatic   Eyes- Conjunctivae pink, anicteric  Throat- Oral mucosa pink and dry  Neck-  No stridor, no jugular venous distention or carotid bruit.    CHEST: Chest symmetrical and non-tender to palpation. No accessory muscle use or intercostal retractions  RESPIRATORY: Lung sounds - clear but diminished  CARDIOVASCULAR:     Heart Inspection- shows no noted pulsations  Heart Palpation- no heaves or thrills; PMI is non-displaced   Heart Ausculation- Regular rate and rhythm, no murmur, s3, s4 or rub   PV: No lower extremity edema. No varicosities. Feet warm but pedal pulses not palpable. +LUE AVF-+thrill/+bruit. ABDOMEN: Soft, non-tender to light palpation. Bowel sounds present. No palpable masses no organomegaly; no abdominal bruit  MS: Good muscle strength and tone. No atrophy or abnormal movements. : Deferred  SKIN: Warm and dry, no statis dermatitis or ulcers   NEURO / PSYCH: Oriented to person to place. Speech clear but he has some difficulty answering questions. IMPRESSION:  1. Recurrent syncopal events in setting of vomiting, diarrhea, poor PO intake and hypotension  2. Subdural hematoma with midline shift s/p fall, s/p syncope  3. Hypotension. History of hypertension, with more recent hypotension for which he is on Midodrine. 4. Paroxysmal atrial fibrillation: currently in SR  5. Chronic Coumadin therapy with INR of 3.8 on presentation. S/p Vitamin K  6.  Chronic kidney disease: on

## 2018-12-10 NOTE — ED PROVIDER NOTES
45-year-old male with prior medical history of end stage renal disease, on dialysis, chronically anticoagulated on Coumadin, presenting to the emergency Department with primary complaint of syncopal episode. Patient is a nursing home resident, who is brought to the emergency department by EMS. Per EMS report from nursing states that the patient had 2 syncopal episodes. There is no history indicating whether or not the patient hit his head. The patient denies hitting his head, however he cannot remember the syncopal episodes. He is complaining of nausea, vomiting, diarrhea, which has been present for the last 3 days, better with nothing, and worse with by mouth intake. Emesis is been nonbloody nonbilious, diarrhea has been non-bloody. He denies any chest pain, shortness of breath, or abdominal pain. He does not make urine. He is a dialysis patient, he receives dialysis Monday Wednesday and Friday, he has not missed any recent appointments. Review of Systems   Constitutional: Negative for chills and fever. HENT: Negative for ear pain, sinus pressure and sore throat. Eyes: Negative for pain, discharge and redness. Respiratory: Negative for cough, shortness of breath and wheezing. Cardiovascular: Negative for chest pain. Gastrointestinal: Positive for diarrhea, nausea and vomiting. Negative for abdominal pain. Genitourinary: Negative for dysuria and frequency. Musculoskeletal: Negative for arthralgias and back pain. Skin: Negative for rash and wound. Neurological: Positive for syncope. Negative for weakness and headaches. Hematological: Negative for adenopathy. All other systems reviewed and are negative. Physical Exam   Constitutional: He is oriented to person, place, and time. He appears well-developed and well-nourished. Sitting upright, awake alert and answered questions appropriately. There is notable emesis on the patient's chin.    HENT:   Head: Normocephalic and Rate: 92  Rhythm: Sinus  Interpretation: Normal sinus rhythm with occasional premature atrial contractions, incomplete right bundle branch block, normal axis,  Comparison: No acute changes when compared to previous EKG on December 5, 2018      --------------------------------------------- PAST HISTORY ---------------------------------------------  Past Medical History:  has a past medical history of Anemia; Arthritis; Atrial fibrillation ([address] Calcaneal spur of both feet; Cardiomyopathy; Cardiomyopathy ([address] CHF (congestive heart failure) ([address] Chronic kidney disease; Combined systolic and diastolic heart failure ([address] Depression; Dialysis patient; DVT (deep venous thrombosis) ([address] Dysphagia; Failure to thrive (0-17); Gastrointestinal hemorrhage; Gout; Hemodialysis patient ([address] Hyperlipidemia; Hypertension; Hypotension of hemodialysis; Lobar pneumonia ([address] Peripheral vascular disease; Pulmonary hypertension ([address] 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 Past Surgical History:  has a past surgical history that includes ECHO Compl W Dop Color Flow (2/24/2013); Cardiac catheterization (2/26/13); Vena Cava Filter Placement (8/14); Upper gastrointestinal endoscopy (4/14, 4/14/16, & 4/24/16); Dialysis fistula creation (Left, 12/29/2017); and other surgical history (04/23/2018). Social History:  reports that he has never smoked. He has never used smokeless tobacco. He reports that he does not drink alcohol or use drugs. Family History: family history is not on file. The patients home medications have been reviewed. Allergies: Patient has no known allergies.     -------------------------------------------------- RESULTS -------------------------------------------------    LABS:  Results for orders placed or performed during the hospital encounter of 12/10/18   CBC Auto Differential   Result Value Ref Range    WBC 6.0 4.5 - 11.5 E9/L    RBC 3.25 (L) 3.80 - 5.80 E12/L    Hemoglobin 9.9 (L) 12.5 - 16.5 g/dL    Hematocrit 32.2 (L) 37.0 - 54.0 %    MCV 99.1 80.0 - 99.9 fL    MCH 30.5 26.0 - 35.0 pg    MCHC 30.7 (L) 32.0 - 34.5 %    RDW 22.0 (H) 11.5 - 15.0 fL    Platelets 262 436 - 265 E9/L    MPV 11.6 7.0 - 12.0 fL    Neutrophils % 72.8 43.0 - 80.0 %    Lymphocytes % 18.4 (L) 20.0 - 42.0 %    Monocytes % 6.1 2.0 - 12.0 %    Eosinophils % 1.8 0.0 - 6.0 %    Basophils % 0.5 0.0 - 2.0 %    Neutrophils # 4.44 1.80 - 7.30 E9/L    Lymphocytes # 1.08 (L) 1.50 - 4.00 E9/L    Monocytes # 0.36 0.10 - 0.95 E9/L    Eosinophils # 0.11 0.05 - 0.50 E9/L    Basophils # 0.00 0.00 - 0.20 E9/L    Metamyelocytes Relative 0.9 0.0 - 1.0 %    Anisocytosis 2+     Poikilocytes 2+     Ovalocytes 2+    Comprehensive Metabolic Panel w/ Reflex to MG   Result Value Ref Range    Sodium 142 132 - 146 mmol/L    Potassium reflex Magnesium 3.0 (L) 3.5 - 5.0 mmol/L    Chloride 99 98 - 107 mmol/L    CO2 30 (H) 22 - 29 mmol/L    Anion Gap 13 7 - 16 mmol/L    Glucose 96 74 - 99 mg/dL    BUN 24 (H) 8 - 23 mg/dL    CREATININE 6.9 (H) 0.7 - 1.2 mg/dL    GFR Non-African American 9 >=60 mL/min/1.73    GFR African American 9     Calcium 8.2 (L) 8.6 - 10.2 mg/dL    Total Protein 5.8 (L) 6.4 - 8.3 g/dL    Alb 2.8 (L) 3.5 - 5.2 g/dL    Total Bilirubin 1.7 (H) 0.0 - 1.2 mg/dL    Alkaline Phosphatase 87 40 - 129 U/L    ALT 9 0 - 40 U/L    AST 20 0 - 39 U/L   Lipase   Result Value Ref Range    Lipase 16 13 - 60 U/L   Troponin   Result Value Ref Range    Troponin 0.38 (H) 0.00 - 0.03 ng/mL   Lactic Acid, Plasma   Result Value Ref Range    Lactic Acid 2.4 (H) 0.5 - 2.2 mmol/L   Protime-INR   Result Value Ref Range    Protime 42.5 (H) 9.3 - 12.4 sec    INR 3.8    APTT   Result Value Ref Range    aPTT 57.1 (H) 24.5 - 35.1 sec   Magnesium   Result Value Ref Range    Magnesium 1.8 1.6 - 2.6 mg/dL   POCT Venous   Result Value Ref Range    POC Sodium 143 132 - 146 mmol/L    POC Potassium 2.8 (L) 3.5 - 1 . Automated exposure control, 2. Adjustment of MA and or KV according to patient's size or 3. Use of iterative reconstruction. Multiple CT sections were obtained with sagittal and coronal MPR reconstructions. A small subgaleal hematoma is seen overlying the frontal bones. Diffuse parenchymal volume loss is seen with concordant ventriculomegaly. Scattered areas of hypoattenuation are seen throughout the periventricular white matter and centrum semiovale consistent with chronic microangiopathic changes. There is no evidence for hemorrhage. There is no infarct identified. There is no mass effect identified. There is no mass identified. Mild mucosal thickening is seen within the right maxillary sinus, new since the prior exam.     Frontal scalp subgaleal hematoma. Mild mucosal thickening of the right maxillary sinus which may represent sinusitis in the appropriate clinical setting. Age-appropriate diffuse atrophy with microangiopathic changes. No intracranial hemorrhage or signs of ischemia which may go undetected in the hyperacute phase. Ct Cervical Spine Wo Contrast    Result Date: 2018  Patient MRN:  [id number] : 1934 Age: 80 years Gender: Male Order Date:  2018 1:15 PM EXAM: CT CERVICAL SPINE WO CONTRAST NUMBER OF IMAGES:  65 INDICATION:  trauma  COMPARISON:  TECHNIQUE: Helical CT of the cervical spine was performed from the skull base through the upper thoracic spine. Coronal and sagittal reconstructions were also reviewed. Low-dose CT  acquisition technique included one of following options; 1 . Automated exposure control, 2. Adjustment of MA and or KV according to patient's size or 3. Use of iterative reconstruction. FINDINGS:  There is no evidence of acute fracture or facet dislocation. There is a mildly exaggerated cervical lordosis. Vertebral body height and alignment are normally maintained.  Again seen is multilevel degenerative disc disease with moderate to marked loss of disc

## 2018-12-10 NOTE — ED NOTES
Bed: 09  Expected date:   Expected time:   Means of arrival:   Comments:     Bruno Belle RN  12/10/18 1938

## 2018-12-11 LAB
ALBUMIN SERPL-MCNC: 2.5 G/DL (ref 3.5–5.2)
ALP BLD-CCNC: 73 U/L (ref 40–129)
ALT SERPL-CCNC: 7 U/L (ref 0–40)
ANION GAP SERPL CALCULATED.3IONS-SCNC: 16 MMOL/L (ref 7–16)
ANISOCYTOSIS: ABNORMAL
AST SERPL-CCNC: 14 U/L (ref 0–39)
BASOPHILS ABSOLUTE: 0.05 E9/L (ref 0–0.2)
BASOPHILS RELATIVE PERCENT: 0.9 % (ref 0–2)
BILIRUB SERPL-MCNC: 1.4 MG/DL (ref 0–1.2)
BUN BLDV-MCNC: 27 MG/DL (ref 8–23)
BURR CELLS: ABNORMAL
CALCIUM SERPL-MCNC: 7.8 MG/DL (ref 8.6–10.2)
CHLORIDE BLD-SCNC: 101 MMOL/L (ref 98–107)
CO2: 28 MMOL/L (ref 22–29)
CREAT SERPL-MCNC: 7.7 MG/DL (ref 0.7–1.2)
EOSINOPHILS ABSOLUTE: 0.1 E9/L (ref 0.05–0.5)
EOSINOPHILS RELATIVE PERCENT: 1.8 % (ref 0–6)
GFR AFRICAN AMERICAN: 8
GFR NON-AFRICAN AMERICAN: 8 ML/MIN/1.73
GLUCOSE BLD-MCNC: 62 MG/DL (ref 74–99)
HCT VFR BLD CALC: 27.2 % (ref 37–54)
HEMOGLOBIN: 8.6 G/DL (ref 12.5–16.5)
INR BLD: 1.9
LYMPHOCYTES ABSOLUTE: 1.16 E9/L (ref 1.5–4)
LYMPHOCYTES RELATIVE PERCENT: 21.2 % (ref 20–42)
MAGNESIUM: 1.7 MG/DL (ref 1.6–2.6)
MCH RBC QN AUTO: 31.5 PG (ref 26–35)
MCHC RBC AUTO-ENTMCNC: 31.6 % (ref 32–34.5)
MCV RBC AUTO: 99.6 FL (ref 80–99.9)
MONOCYTES ABSOLUTE: 0.28 E9/L (ref 0.1–0.95)
MONOCYTES RELATIVE PERCENT: 5.3 % (ref 2–12)
MYELOCYTE PERCENT: 0.9 % (ref 0–0)
NEUTROPHILS ABSOLUTE: 3.85 E9/L (ref 1.8–7.3)
NEUTROPHILS RELATIVE PERCENT: 69 % (ref 43–80)
OVALOCYTES: ABNORMAL
PDW BLD-RTO: 21.9 FL (ref 11.5–15)
PLATELET # BLD: 204 E9/L (ref 130–450)
PMV BLD AUTO: 11.8 FL (ref 7–12)
POIKILOCYTES: ABNORMAL
POLYCHROMASIA: ABNORMAL
POTASSIUM SERPL-SCNC: 3.1 MMOL/L (ref 3.5–5)
PROMYELOCYTES PERCENT: 0.9 % (ref 0–0)
PROTHROMBIN TIME: 21.3 SEC (ref 9.3–12.4)
RBC # BLD: 2.73 E12/L (ref 3.8–5.8)
SODIUM BLD-SCNC: 145 MMOL/L (ref 132–146)
TOTAL PROTEIN: 5 G/DL (ref 6.4–8.3)
TROPONIN: 0.35 NG/ML (ref 0–0.03)
WBC # BLD: 5.5 E9/L (ref 4.5–11.5)

## 2018-12-11 PROCEDURE — 2060000000 HC ICU INTERMEDIATE R&B

## 2018-12-11 PROCEDURE — 85610 PROTHROMBIN TIME: CPT

## 2018-12-11 PROCEDURE — 80074 ACUTE HEPATITIS PANEL: CPT

## 2018-12-11 PROCEDURE — 86706 HEP B SURFACE ANTIBODY: CPT

## 2018-12-11 PROCEDURE — 80053 COMPREHEN METABOLIC PANEL: CPT

## 2018-12-11 PROCEDURE — 90935 HEMODIALYSIS ONE EVALUATION: CPT

## 2018-12-11 PROCEDURE — 6370000000 HC RX 637 (ALT 250 FOR IP): Performed by: NEUROLOGICAL SURGERY

## 2018-12-11 PROCEDURE — 6370000000 HC RX 637 (ALT 250 FOR IP): Performed by: INTERNAL MEDICINE

## 2018-12-11 PROCEDURE — 83735 ASSAY OF MAGNESIUM: CPT

## 2018-12-11 PROCEDURE — 36415 COLL VENOUS BLD VENIPUNCTURE: CPT

## 2018-12-11 PROCEDURE — 85025 COMPLETE CBC W/AUTO DIFF WBC: CPT

## 2018-12-11 RX ORDER — LEVETIRACETAM 500 MG/1
500 TABLET ORAL 2 TIMES DAILY
Status: DISCONTINUED | OUTPATIENT
Start: 2018-12-11 | End: 2018-12-17 | Stop reason: HOSPADM

## 2018-12-11 RX ADMIN — ALLOPURINOL 100 MG: 100 TABLET ORAL at 09:46

## 2018-12-11 RX ADMIN — Medication 1 LOZENGE: at 18:41

## 2018-12-11 RX ADMIN — ATORVASTATIN CALCIUM 20 MG: 20 TABLET, FILM COATED ORAL at 09:46

## 2018-12-11 RX ADMIN — LEVOTHYROXINE SODIUM 50 MCG: 50 TABLET ORAL at 06:57

## 2018-12-11 RX ADMIN — LEVETIRACETAM 500 MG: 500 TABLET, FILM COATED ORAL at 09:46

## 2018-12-11 RX ADMIN — MIDODRINE HYDROCHLORIDE 10 MG: 5 TABLET ORAL at 09:46

## 2018-12-11 RX ADMIN — MIDODRINE HYDROCHLORIDE 10 MG: 5 TABLET ORAL at 18:40

## 2018-12-11 ASSESSMENT — PAIN SCALES - GENERAL
PAINLEVEL_OUTOF10: 0

## 2018-12-11 NOTE — H&P
fistula by DR Mendoza    UPPER GASTROINTESTINAL ENDOSCOPY  4/14, 4/14/16, & 4/24/16    VENA CAVA FILTER PLACEMENT  8/14    d/t DVT       Medications Prior to Admission:    Prescriptions Prior to Admission: warfarin (COUMADIN) 2 MG tablet, Take 2 mg by mouth daily  midodrine (PROAMATINE) 10 MG tablet, Take 1 tablet by mouth 3 times daily  albuterol (PROVENTIL) (2.5 MG/3ML) 0.083% nebulizer solution, Take 2.5 mg by nebulization every 6 hours as needed for Shortness of Breath  acetaminophen (ARTHRITIS PAIN RELIEF) 650 MG extended release tablet, Take 650 mg by mouth every 8 hours as needed for Pain  benzocaine (CEPACOL) 10 MG LOZG, Take 1 lozenge by mouth every 4 hours as needed   magnesium hydroxide (MILK OF MAGNESIA) 400 MG/5ML suspension, Take 30 mLs by mouth daily as needed for Constipation   allopurinol (ZYLOPRIM) 100 MG tablet, Take 100 mg by mouth daily  atorvastatin (LIPITOR) 20 MG tablet, Take 20 mg by mouth daily  loperamide (IMODIUM) 2 MG capsule, Take 2 mg by mouth every 12 hours as needed for Diarrhea   ondansetron (ZOFRAN) 4 MG tablet, Take 4 mg by mouth every 6 hours as needed for Nausea or Vomiting  levothyroxine (SYNTHROID) 50 MCG tablet, Take 50 mcg by mouth Daily    Allergies:    Patient has no known allergies. Social History:    reports that he has never smoked. He has never used smokeless tobacco. He reports that he does not drink alcohol or use drugs. Family History:   family history is not on file. REVIEW OF SYSTEMS:  As above in the HPI, otherwise negative    PHYSICAL EXAM:    Vitals:  BP (!) 110/57   Pulse 83   Temp 98.9 °F (37.2 °C) (Temporal)   Resp 16   Ht 6' 1\" (1.854 m)   Wt 217 lb (98.4 kg)   SpO2 92%   BMI 28.63 kg/m²     General:  Awake, alert, oriented X 3. Well developed, well nourished, well groomed. No apparent distress. HEENT:  Normocephalic, atraumatic. Pupils equal, round, reactive to light. No scleral icterus. No conjunctival injection.   Normal lips,

## 2018-12-11 NOTE — CARE COORDINATION
12/11/2018 Social Work:discharge planning   Patient is from [facility] and can go back without precert. Spoke with patient regarding sw role and discharge plan. Plan is back to [facility] at discharge, will follow. Envelope with ambulette form in chart.

## 2018-12-12 PROBLEM — R13.10 DYSPHAGIA: Status: ACTIVE | Noted: 2018-12-12

## 2018-12-12 LAB
ANION GAP SERPL CALCULATED.3IONS-SCNC: 16 MMOL/L (ref 7–16)
BUN BLDV-MCNC: 9 MG/DL (ref 8–23)
CALCIUM SERPL-MCNC: 7.6 MG/DL (ref 8.6–10.2)
CHLORIDE BLD-SCNC: 98 MMOL/L (ref 98–107)
CO2: 27 MMOL/L (ref 22–29)
CREAT SERPL-MCNC: 4.2 MG/DL (ref 0.7–1.2)
GFR AFRICAN AMERICAN: 16
GFR NON-AFRICAN AMERICAN: 16 ML/MIN/1.73
GLUCOSE BLD-MCNC: 47 MG/DL (ref 74–99)
HAV IGM SER IA-ACNC: NORMAL
HBV SURFACE AB TITR SER: NORMAL {TITER}
HCT VFR BLD CALC: 26.4 % (ref 37–54)
HCT VFR BLD CALC: 26.7 % (ref 37–54)
HEMOGLOBIN: 8.4 G/DL (ref 12.5–16.5)
HEMOGLOBIN: 8.4 G/DL (ref 12.5–16.5)
HEPATITIS B CORE IGM ANTIBODY: NORMAL
HEPATITIS B SURFACE ANTIGEN INTERPRETATION: NORMAL
HEPATITIS C ANTIBODY INTERPRETATION: NORMAL
INR BLD: 1.2
MCH RBC QN AUTO: 31.7 PG (ref 26–35)
MCHC RBC AUTO-ENTMCNC: 31.5 % (ref 32–34.5)
MCV RBC AUTO: 100.8 FL (ref 80–99.9)
PDW BLD-RTO: 22.5 FL (ref 11.5–15)
PLATELET # BLD: 193 E9/L (ref 130–450)
PMV BLD AUTO: 11.5 FL (ref 7–12)
POTASSIUM SERPL-SCNC: 3.5 MMOL/L (ref 3.5–5)
PROTHROMBIN TIME: 14.1 SEC (ref 9.3–12.4)
RBC # BLD: 2.65 E12/L (ref 3.8–5.8)
SODIUM BLD-SCNC: 141 MMOL/L (ref 132–146)
WBC # BLD: 5.1 E9/L (ref 4.5–11.5)

## 2018-12-12 PROCEDURE — 90935 HEMODIALYSIS ONE EVALUATION: CPT | Performed by: INTERNAL MEDICINE

## 2018-12-12 PROCEDURE — 92610 EVALUATE SWALLOWING FUNCTION: CPT

## 2018-12-12 PROCEDURE — 85027 COMPLETE CBC AUTOMATED: CPT

## 2018-12-12 PROCEDURE — 87040 BLOOD CULTURE FOR BACTERIA: CPT

## 2018-12-12 PROCEDURE — 2060000000 HC ICU INTERMEDIATE R&B

## 2018-12-12 PROCEDURE — 6370000000 HC RX 637 (ALT 250 FOR IP): Performed by: INTERNAL MEDICINE

## 2018-12-12 PROCEDURE — 6370000000 HC RX 637 (ALT 250 FOR IP): Performed by: NEUROLOGICAL SURGERY

## 2018-12-12 PROCEDURE — 80048 BASIC METABOLIC PNL TOTAL CA: CPT

## 2018-12-12 PROCEDURE — 85018 HEMOGLOBIN: CPT

## 2018-12-12 PROCEDURE — 85014 HEMATOCRIT: CPT

## 2018-12-12 PROCEDURE — 85610 PROTHROMBIN TIME: CPT

## 2018-12-12 PROCEDURE — 2580000003 HC RX 258: Performed by: INTERNAL MEDICINE

## 2018-12-12 PROCEDURE — 36415 COLL VENOUS BLD VENIPUNCTURE: CPT

## 2018-12-12 RX ORDER — PANTOPRAZOLE SODIUM 40 MG/1
40 TABLET, DELAYED RELEASE ORAL
Status: DISCONTINUED | OUTPATIENT
Start: 2018-12-13 | End: 2018-12-17 | Stop reason: HOSPADM

## 2018-12-12 RX ADMIN — MIDODRINE HYDROCHLORIDE 10 MG: 5 TABLET ORAL at 08:41

## 2018-12-12 RX ADMIN — LEVETIRACETAM 500 MG: 500 TABLET, FILM COATED ORAL at 08:41

## 2018-12-12 RX ADMIN — ATORVASTATIN CALCIUM 20 MG: 20 TABLET, FILM COATED ORAL at 08:41

## 2018-12-12 RX ADMIN — Medication 10 ML: at 20:44

## 2018-12-12 RX ADMIN — MIDODRINE HYDROCHLORIDE 10 MG: 5 TABLET ORAL at 17:56

## 2018-12-12 RX ADMIN — ALLOPURINOL 100 MG: 100 TABLET ORAL at 08:41

## 2018-12-12 RX ADMIN — LEVETIRACETAM 500 MG: 500 TABLET, FILM COATED ORAL at 20:46

## 2018-12-12 ASSESSMENT — PAIN SCALES - GENERAL
PAINLEVEL_OUTOF10: 0

## 2018-12-12 NOTE — CONSULTS
round, and reactive.  ________. LUNGS:  Clear throughout. ABDOMEN:  Soft. EXTREMITIES:  Without pitting edema. NEUROLOGIC:  Cranial nerves II through XII were intact. No motor or  sensory deficits. DIAGNOSES:  Small structural hematoma, subacute in nature. I agree with  Keppra. Hold on anticoagulation for now _____.         Rachelle Monge MD    D: 12/11/2018 16:12:07       T: 12/11/2018 18:48:14     [email or web address]  Job#: [id number]     Doc#: [id number]    CC:

## 2018-12-12 NOTE — PROGRESS NOTES
pharyngitis/esophagitis    Plan:  [facility] Ripa [facility] tele  [facility]  NSurg Consult appreciated-- no full AC until documented resolution of bleeding (likely 2-4 wks)  Orthostatics  Nystatin S and S  PPI  ST  Card Consult appreciated   Nephro Consult appreciated   Medications for other co morbidities cont as appropriate w dosage adjustments as necessary   PT/OT  DVT GI PPx  DC planning-- back to [facility] soon    Electronically signed by Christine Haider MD on 12/12/2018 at 9:17 AM

## 2018-12-12 NOTE — PROGRESS NOTES
Output                0 ml   Net                0 ml       Wt Readings from Last 3 Encounters:   12/12/18 188 lb 0.8 oz (85.3 kg)   12/05/18 217 lb (98.4 kg)   10/10/18 217 lb 8 oz (98.7 kg)       Constitutional:  Patient in no acute distress  Head: normocephalic, atraumatic  Cardiovascular: regular rate and rhythm, no murmurs, gallops, or rubs  Respiratory:  Clear, no rales, rhochi, or wheezes  Gastrointestinal: soft, nontender, nondistended  Ext: no edema  Neuro: aaox3  Skin: dry, no rash      DATA:      Recent Labs      12/10/18   1255  12/11/18   1014  12/12/18   0634   WBC  6.0  5.5  5.1   HGB  9.9*  8.6*  8.4*   HCT  32.2*  27.2*  26.7*   MCV  99.1  99.6  100.8*   PLT  238  204  193     Recent Labs      12/10/18   1255  12/10/18   1300  12/11/18   1014  12/12/18   0634   NA  142   --   145  141   K  3.0*   --   3.1*  3.5   CL  99   --   101  98   CO2  30*   --   28  27   BUN  24*   --   27*  9   CREATININE  6.9*  6.7*  7.7*  4.2*   LABGLOM  9  8  8  16   GLUCOSE  96   --   62*  47*   CALCIUM  8.2*   --   7.8*  7.6*     Recent Labs      12/10/18   1255  12/11/18   1014   ALT  9  7       Iron studies:  Lab Results   Component Value Date    FERRITIN 4,801 05/23/2017    IRON 66 05/23/2017    TIBC 94 (L) 05/23/2017     Bone disease:  Lab Results   Component Value Date    MG 1.7 12/11/2018    PHOS 5.1 (H) 10/08/2018     Nutrition:  Lab Results   Component Value Date    LABALBU 2.5 (L) 12/11/2018    LABALBU 2.8 (L) 12/10/2018    LABALBU 3.1 (L) 12/05/2018         IMPRESSION/RECOMMENDATIONS:      1. esrd  Hold hd mon for hypotension and dehydration  Sp hd tues  Usual schedule mwf  For hd again today     2. Hypotension/syncope  Cautious ivf  On midodrine chronically  In setting of nausea, vomiting, diarrhea  panculture  Send c diff     3. Anemia  Follow on richelle     4. HFrEF  Cautious hydration at this time     5. SDH  Neuro monitoring  Holding oac      Theodora Oliveros.  Alise Bradley MD

## 2018-12-12 NOTE — FLOWSHEET NOTE
12/12/18 1627   Vital Signs   BP (!) 102/50   Temp 99 °F (37.2 °C)   Pulse 84   Weight 188 lb 7.9 oz (85.5 kg)   Weight Method Bed scale   Percent Weight Change 0.23   Pain Assessment   Pain Assessment 0-10   Pain Level 0   Post-Hemodialysis Assessment   Post-Treatment Procedures Blood returned; Access bleeding time < 10 minutes   Machine Disinfection Process Acid/Vinegar Clean;Heat Disinfect; Exterior Machine Disinfection   Rinseback Volume (ml) 300 ml   Total Liters Processed (l/min) 65.9 l/min   Dialyzer Clearance Lightly streaked   Duration of Treatment (minutes) 180 minutes   Heparin amount administered during treatment (units) 0 units   Hemodialysis Intake (ml) 600 ml   Hemodialysis Output (ml) 411 ml   NET Removed (ml) (+189)   Tolerated Treatment Fair   Patient Response to Treatment transient hypotension resolved with uf off, +189cc   Bilateral Breath Sounds Diminished   Edema Right lower extremity; Left lower extremity   Physician Notified?  No

## 2018-12-13 LAB
ANION GAP SERPL CALCULATED.3IONS-SCNC: 15 MMOL/L (ref 7–16)
BUN BLDV-MCNC: 6 MG/DL (ref 8–23)
CALCIUM SERPL-MCNC: 7.9 MG/DL (ref 8.6–10.2)
CHLORIDE BLD-SCNC: 96 MMOL/L (ref 98–107)
CO2: 27 MMOL/L (ref 22–29)
CREAT SERPL-MCNC: 3.2 MG/DL (ref 0.7–1.2)
GFR AFRICAN AMERICAN: 22
GFR NON-AFRICAN AMERICAN: 22 ML/MIN/1.73
GLUCOSE BLD-MCNC: 53 MG/DL (ref 74–99)
HCT VFR BLD CALC: 24.3 % (ref 37–54)
HEMOGLOBIN: 7.7 G/DL (ref 12.5–16.5)
INR BLD: 1.2
MCH RBC QN AUTO: 31.6 PG (ref 26–35)
MCHC RBC AUTO-ENTMCNC: 31.7 % (ref 32–34.5)
MCV RBC AUTO: 99.6 FL (ref 80–99.9)
PDW BLD-RTO: 22.9 FL (ref 11.5–15)
PLATELET # BLD: 185 E9/L (ref 130–450)
PMV BLD AUTO: 11.4 FL (ref 7–12)
POTASSIUM SERPL-SCNC: 3.6 MMOL/L (ref 3.5–5)
PROTHROMBIN TIME: 13.4 SEC (ref 9.3–12.4)
RBC # BLD: 2.44 E12/L (ref 3.8–5.8)
SODIUM BLD-SCNC: 138 MMOL/L (ref 132–146)
WBC # BLD: 5.8 E9/L (ref 4.5–11.5)

## 2018-12-13 PROCEDURE — 6370000000 HC RX 637 (ALT 250 FOR IP): Performed by: INTERNAL MEDICINE

## 2018-12-13 PROCEDURE — G8988 SELF CARE GOAL STATUS: HCPCS

## 2018-12-13 PROCEDURE — G8981 BODY POS CURRENT STATUS: HCPCS | Performed by: PHYSICAL THERAPIST

## 2018-12-13 PROCEDURE — G8982 BODY POS GOAL STATUS: HCPCS | Performed by: PHYSICAL THERAPIST

## 2018-12-13 PROCEDURE — 6370000000 HC RX 637 (ALT 250 FOR IP): Performed by: NEUROLOGICAL SURGERY

## 2018-12-13 PROCEDURE — 36415 COLL VENOUS BLD VENIPUNCTURE: CPT

## 2018-12-13 PROCEDURE — 2060000000 HC ICU INTERMEDIATE R&B

## 2018-12-13 PROCEDURE — 85027 COMPLETE CBC AUTOMATED: CPT

## 2018-12-13 PROCEDURE — 80048 BASIC METABOLIC PNL TOTAL CA: CPT

## 2018-12-13 PROCEDURE — G8987 SELF CARE CURRENT STATUS: HCPCS

## 2018-12-13 PROCEDURE — 2580000003 HC RX 258: Performed by: INTERNAL MEDICINE

## 2018-12-13 PROCEDURE — 97161 PT EVAL LOW COMPLEX 20 MIN: CPT | Performed by: PHYSICAL THERAPIST

## 2018-12-13 PROCEDURE — 97165 OT EVAL LOW COMPLEX 30 MIN: CPT

## 2018-12-13 PROCEDURE — 85610 PROTHROMBIN TIME: CPT

## 2018-12-13 PROCEDURE — 87324 CLOSTRIDIUM AG IA: CPT

## 2018-12-13 RX ORDER — DEXTROSE AND SODIUM CHLORIDE 5; .45 G/100ML; G/100ML
INJECTION, SOLUTION INTRAVENOUS CONTINUOUS
Status: DISCONTINUED | OUTPATIENT
Start: 2018-12-13 | End: 2018-12-17 | Stop reason: HOSPADM

## 2018-12-13 RX ADMIN — ALLOPURINOL 100 MG: 100 TABLET ORAL at 09:15

## 2018-12-13 RX ADMIN — LEVOTHYROXINE SODIUM 50 MCG: 50 TABLET ORAL at 06:56

## 2018-12-13 RX ADMIN — DEXTROSE AND SODIUM CHLORIDE: 5; 450 INJECTION, SOLUTION INTRAVENOUS at 18:43

## 2018-12-13 RX ADMIN — ATORVASTATIN CALCIUM 20 MG: 20 TABLET, FILM COATED ORAL at 09:15

## 2018-12-13 RX ADMIN — Medication 10 ML: at 09:16

## 2018-12-13 RX ADMIN — PANTOPRAZOLE SODIUM 40 MG: 40 TABLET, DELAYED RELEASE ORAL at 06:56

## 2018-12-13 RX ADMIN — NYSTATIN 500000 UNITS: 500000 SUSPENSION ORAL at 09:20

## 2018-12-13 RX ADMIN — LEVETIRACETAM 500 MG: 500 TABLET, FILM COATED ORAL at 09:15

## 2018-12-13 RX ADMIN — MIDODRINE HYDROCHLORIDE 10 MG: 5 TABLET ORAL at 09:15

## 2018-12-13 RX ADMIN — MIDODRINE HYDROCHLORIDE 10 MG: 5 TABLET ORAL at 13:11

## 2018-12-13 RX ADMIN — Medication 10 ML: at 20:58

## 2018-12-13 ASSESSMENT — PAIN SCALES - GENERAL
PAINLEVEL_OUTOF10: 0

## 2018-12-13 NOTE — CONSULTS
 OTHER SURGICAL HISTORY  04/23/2018    Left arm fistula by DR Mendoza    UPPER GASTROINTESTINAL ENDOSCOPY  4/14, 4/14/16, & 4/24/16    VENA CAVA FILTER PLACEMENT  8/14    d/t DVT         Current Medications:      Current Facility-Administered Medications   Medication Dose Route Frequency Provider Last Rate Last Dose    nystatin (MYCOSTATIN) 173056 UNIT/ML suspension 500,000 Units  5 mL Oral 4x Daily Silvia Mas MD   500,000 Units at 12/13/18 0920    pantoprazole (PROTONIX) tablet 40 mg  40 mg Oral QAM AC Silvia Mas MD   40 mg at 12/13/18 0656    levETIRAcetam (KEPPRA) tablet 500 mg  500 mg Oral BID Nieves Rowland MD   500 mg at 12/13/18 0915    acetaminophen (TYLENOL) tablet 650 mg  650 mg Oral Q8H PRN Claudia Wong MD        albuterol (PROVENTIL) nebulizer solution 2.5 mg  2.5 mg Nebulization Q6H PRN Claudia Wong MD        allopurinol (ZYLOPRIM) tablet 100 mg  100 mg Oral Daily Claudia Wong MD   100 mg at 12/13/18 0915    atorvastatin (LIPITOR) tablet 20 mg  20 mg Oral Daily Claudia Wong MD   20 mg at 12/13/18 0915    benzocaine-menthol (CEPACOL SORE THROAT) lozenge 1 lozenge  1 lozenge Oral Q4H PRN Claudia Wong MD   1 lozenge at 12/11/18 1841    levothyroxine (SYNTHROID) tablet 50 mcg  50 mcg Oral Daily Claudia Wong MD   50 mcg at 12/13/18 0656    midodrine (PROAMATINE) tablet 10 mg  10 mg Oral TID Claudia Wong MD   10 mg at 12/13/18 1311    sodium chloride flush 0.9 % injection 10 mL  10 mL Intravenous 2 times per day Claudia Wong MD   10 mL at 12/13/18 0916    sodium chloride flush 0.9 % injection 10 mL  10 mL Intravenous PRN Claudia Wong MD        magnesium hydroxide (MILK OF MAGNESIA) 400 MG/5ML suspension 30 mL  30 mL Oral Daily PRN Claudia Wong MD        [facility] injection 4 mg  4 mg Intravenous Q6H PRN Claudia Wong MD           Allergies:  Patient has no known allergies.     Social History:      Social History     Social

## 2018-12-13 NOTE — PROGRESS NOTES
Patient has refused food and fluids throughout the shift toady. Dr. Ashraf Rihca made aware with new orders rec'd and noted.

## 2018-12-14 PROBLEM — R13.10 ODYNOPHAGIA: Status: ACTIVE | Noted: 2018-12-14

## 2018-12-14 LAB
ANION GAP SERPL CALCULATED.3IONS-SCNC: 16 MMOL/L (ref 7–16)
BUN BLDV-MCNC: 9 MG/DL (ref 8–23)
C DIFFICILE TOXIN, EIA: NORMAL
CALCIUM SERPL-MCNC: 8.1 MG/DL (ref 8.6–10.2)
CHLORIDE BLD-SCNC: 96 MMOL/L (ref 98–107)
CO2: 26 MMOL/L (ref 22–29)
CREAT SERPL-MCNC: 4.7 MG/DL (ref 0.7–1.2)
GFR AFRICAN AMERICAN: 14
GFR NON-AFRICAN AMERICAN: 14 ML/MIN/1.73
GLUCOSE BLD-MCNC: 73 MG/DL (ref 74–99)
HCT VFR BLD CALC: 25.6 % (ref 37–54)
HEMOGLOBIN: 8 G/DL (ref 12.5–16.5)
INR BLD: 1.1
MCH RBC QN AUTO: 31.3 PG (ref 26–35)
MCHC RBC AUTO-ENTMCNC: 31.3 % (ref 32–34.5)
MCV RBC AUTO: 100 FL (ref 80–99.9)
PDW BLD-RTO: 23.6 FL (ref 11.5–15)
PLATELET # BLD: 213 E9/L (ref 130–450)
PMV BLD AUTO: 10.8 FL (ref 7–12)
POTASSIUM SERPL-SCNC: 3.3 MMOL/L (ref 3.5–5)
PROTHROMBIN TIME: 12.7 SEC (ref 9.3–12.4)
RBC # BLD: 2.56 E12/L (ref 3.8–5.8)
SODIUM BLD-SCNC: 138 MMOL/L (ref 132–146)
WBC # BLD: 5.4 E9/L (ref 4.5–11.5)

## 2018-12-14 PROCEDURE — 6370000000 HC RX 637 (ALT 250 FOR IP): Performed by: INTERNAL MEDICINE

## 2018-12-14 PROCEDURE — 85610 PROTHROMBIN TIME: CPT

## 2018-12-14 PROCEDURE — 2060000000 HC ICU INTERMEDIATE R&B

## 2018-12-14 PROCEDURE — 85027 COMPLETE CBC AUTOMATED: CPT

## 2018-12-14 PROCEDURE — 6370000000 HC RX 637 (ALT 250 FOR IP): Performed by: NEUROLOGICAL SURGERY

## 2018-12-14 PROCEDURE — 80048 BASIC METABOLIC PNL TOTAL CA: CPT

## 2018-12-14 PROCEDURE — 2580000003 HC RX 258: Performed by: INTERNAL MEDICINE

## 2018-12-14 PROCEDURE — 90935 HEMODIALYSIS ONE EVALUATION: CPT

## 2018-12-14 PROCEDURE — 36415 COLL VENOUS BLD VENIPUNCTURE: CPT

## 2018-12-14 PROCEDURE — 6360000002 HC RX W HCPCS: Performed by: INTERNAL MEDICINE

## 2018-12-14 RX ADMIN — DARBEPOETIN ALFA 100 MCG: 100 INJECTION, SOLUTION INTRAVENOUS; SUBCUTANEOUS at 21:56

## 2018-12-14 RX ADMIN — Medication 5 ML: at 20:50

## 2018-12-14 RX ADMIN — NYSTATIN 500000 UNITS: 500000 SUSPENSION ORAL at 20:50

## 2018-12-14 RX ADMIN — DEXTROSE AND SODIUM CHLORIDE: 5; 450 INJECTION, SOLUTION INTRAVENOUS at 19:04

## 2018-12-14 RX ADMIN — LEVETIRACETAM 500 MG: 500 TABLET, FILM COATED ORAL at 20:49

## 2018-12-14 ASSESSMENT — PAIN SCALES - GENERAL
PAINLEVEL_OUTOF10: 0
PAINLEVEL_OUTOF10: 0

## 2018-12-14 ASSESSMENT — PAIN DESCRIPTION - PAIN TYPE: TYPE: ACUTE PAIN;CHRONIC PAIN

## 2018-12-14 NOTE — PROGRESS NOTES
Subjective:  Feeling ok No CP, SOB, F, V, D, P   Sore throat same    Objective:    /64   Pulse 86   Temp 98.4 °F (36.9 °C) (Oral)   Resp 18   Ht 6' 1\" (1.854 m)   Wt 188 lb 7.9 oz (85.5 kg)   SpO2 92%   BMI 24.87 kg/m²     24HR INTAKE/OUTPUT:      Intake/Output Summary (Last 24 hours) at 12/14/18 0954  Last data filed at 12/14/18 6560   Gross per 24 hour   Intake           596.67 ml   Output                0 ml   Net           596.67 ml     nad  Heart:  RRR, no murmurs, gallops, or rubs. Lungs:  CTA bilaterally, no wheeze, rales or rhonchi  Abd: bowel sounds present, nontender, nondistended, no masses  Extrem:  No clubbing, cyanosis, or edema    Most Recent Labs  Lab Results   Component Value Date    WBC 5.8 12/13/2018    HGB 7.7 (L) 12/13/2018    HCT 24.3 (L) 12/13/2018     12/13/2018     12/13/2018    K 3.6 12/13/2018    CL 96 (L) 12/13/2018    CREATININE 3.2 (H) 12/13/2018    BUN 6 (L) 12/13/2018    CO2 27 12/13/2018    GLUCOSE 53 (L) 12/13/2018    ALT 7 12/11/2018    AST 14 12/11/2018    INR 1.2 12/13/2018    TSH 3.870 05/01/2016    LABA1C 5.1 01/11/2015    LABMICR 149.1 (H) 05/21/2017       CT Head WO Contrast   Final Result   Small 2 to 3 mm subdural hematoma along the left cerebral convexity   and falx without midline shift. Surveillance recommended. The findings   were telephoned to the ER physician. XR CHEST PORTABLE   Final Result         Marked improvement in the changes involving both bases since the   previous study Of October 2, 2018. Assessment    Principal Problem:    Syncope and collapse  Active Problems:    Subdural hematoma (HCC)    Pulmonary hypertension (HCC)    Chronic systolic congestive heart failure (HCC)    Paroxysmal atrial fibrillation (HCC)    End stage renal failure on dialysis [facility])    Dysphagia    Odynophagia  Resolved Problems:    * No resolved hospital problems.  *  dehydration 2/2 vomiting /diarrhea  Suspected viral

## 2018-12-14 NOTE — CARE COORDINATION
Met with pt while at HD regarding pt's c/o swallowing and refusing to take nystatin. I met with pt to discuss his non-compliance while staying at the hospital and refusing treatments. I explained rationale for his nystatin and offered to ask for a discharge if he did not want any medical treatment. Pt states he is agreeable to using the nystatin ordered for his continued complaint of a sore throat.

## 2018-12-14 NOTE — PROGRESS NOTES
C/C: CONS bacteremia     The patient is awake and alert. Denies fever, chils   Denies headache   Afebrile         No current facility-administered medications on file prior to encounter. Current Outpatient Prescriptions on File Prior to Encounter   Medication Sig Dispense Refill    midodrine (PROAMATINE) 10 MG tablet Take 1 tablet by mouth 3 times daily 90 tablet 3    albuterol (PROVENTIL) (2.5 MG/3ML) 0.083% nebulizer solution Take 2.5 mg by nebulization every 6 hours as needed for Shortness of Breath      acetaminophen (ARTHRITIS PAIN RELIEF) 650 MG extended release tablet Take 650 mg by mouth every 8 hours as needed for Pain      benzocaine (CEPACOL) 10 MG LOZG Take 1 lozenge by mouth every 4 hours as needed       magnesium hydroxide (MILK OF MAGNESIA) 400 MG/5ML suspension Take 30 mLs by mouth daily as needed for Constipation       allopurinol (ZYLOPRIM) 100 MG tablet Take 100 mg by mouth daily      atorvastatin (LIPITOR) 20 MG tablet Take 20 mg by mouth daily      loperamide (IMODIUM) 2 MG capsule Take 2 mg by mouth every 12 hours as needed for Diarrhea       ondansetron (ZOFRAN) 4 MG tablet Take 4 mg by mouth every 6 hours as needed for Nausea or Vomiting      levothyroxine (SYNTHROID) 50 MCG tablet Take 50 mcg by mouth Daily         REVIEW OF SYSTEMS:       CONSTITUTIONAL:  Denies fever, chill or rigors, nausea or vomiting. HEENT: headache   RESPIRATORY: denies cough, shortness of breath, sputum expectoration, chest pain. CARDIOVASCULAR:  Denies palpitation  GASTROINTESTINAL:  Loss of appetite . GENITOURINARY:  ESRD on HD   INTEGUMENT: denies wound , rash  HEMATOLOGIC/LYMPHATIC:  Denies lymph node swelling, gum bleeding or easy bruising.   MUSCULOSKELETAL:  Denies leg pain , joint pain , joint swelling  NEUROLOGICAL:  Syncope          Objective:    Vitals:    12/14/18 0945   BP: 127/64   Pulse: 86   Resp: 18   Temp: 98.4 °F (36.9 °C)   SpO2: 92%     General Appearance:    Awake, alert , no

## 2018-12-15 LAB
ANION GAP SERPL CALCULATED.3IONS-SCNC: 9 MMOL/L (ref 7–16)
BUN BLDV-MCNC: 2 MG/DL (ref 8–23)
CALCIUM SERPL-MCNC: 7.8 MG/DL (ref 8.6–10.2)
CHLORIDE BLD-SCNC: 101 MMOL/L (ref 98–107)
CO2: 30 MMOL/L (ref 22–29)
CREAT SERPL-MCNC: 2.6 MG/DL (ref 0.7–1.2)
FERRITIN: 1819 NG/ML
FOLATE: 2.8 NG/ML (ref 4.8–24.2)
GFR AFRICAN AMERICAN: 29
GFR NON-AFRICAN AMERICAN: 29 ML/MIN/1.73
GLUCOSE BLD-MCNC: 142 MG/DL (ref 74–99)
HCT VFR BLD CALC: 23.7 % (ref 37–54)
HEMOGLOBIN: 7.4 G/DL (ref 12.5–16.5)
INR BLD: 1.2
IRON SATURATION: 54 % (ref 20–55)
IRON: 44 MCG/DL (ref 59–158)
MCH RBC QN AUTO: 31.2 PG (ref 26–35)
MCHC RBC AUTO-ENTMCNC: 31.2 % (ref 32–34.5)
MCV RBC AUTO: 100 FL (ref 80–99.9)
PDW BLD-RTO: 24.3 FL (ref 11.5–15)
PLATELET # BLD: 194 E9/L (ref 130–450)
PMV BLD AUTO: 11.1 FL (ref 7–12)
POTASSIUM SERPL-SCNC: 3.6 MMOL/L (ref 3.5–5)
PROTHROMBIN TIME: 13.6 SEC (ref 9.3–12.4)
RBC # BLD: 2.37 E12/L (ref 3.8–5.8)
SODIUM BLD-SCNC: 140 MMOL/L (ref 132–146)
TOTAL IRON BINDING CAPACITY: 82 MCG/DL (ref 250–450)
VITAMIN B-12: 1227 PG/ML (ref 211–946)
WBC # BLD: 4.6 E9/L (ref 4.5–11.5)

## 2018-12-15 PROCEDURE — 82607 VITAMIN B-12: CPT

## 2018-12-15 PROCEDURE — 82746 ASSAY OF FOLIC ACID SERUM: CPT

## 2018-12-15 PROCEDURE — 2580000003 HC RX 258: Performed by: INTERNAL MEDICINE

## 2018-12-15 PROCEDURE — 6370000000 HC RX 637 (ALT 250 FOR IP): Performed by: NEUROLOGICAL SURGERY

## 2018-12-15 PROCEDURE — 83550 IRON BINDING TEST: CPT

## 2018-12-15 PROCEDURE — 80048 BASIC METABOLIC PNL TOTAL CA: CPT

## 2018-12-15 PROCEDURE — 82728 ASSAY OF FERRITIN: CPT

## 2018-12-15 PROCEDURE — 83540 ASSAY OF IRON: CPT

## 2018-12-15 PROCEDURE — 6370000000 HC RX 637 (ALT 250 FOR IP): Performed by: INTERNAL MEDICINE

## 2018-12-15 PROCEDURE — 36415 COLL VENOUS BLD VENIPUNCTURE: CPT

## 2018-12-15 PROCEDURE — 2060000000 HC ICU INTERMEDIATE R&B

## 2018-12-15 PROCEDURE — 85027 COMPLETE CBC AUTOMATED: CPT

## 2018-12-15 PROCEDURE — 85610 PROTHROMBIN TIME: CPT

## 2018-12-15 RX ADMIN — NYSTATIN 500000 UNITS: 500000 SUSPENSION ORAL at 17:05

## 2018-12-15 RX ADMIN — DEXTROSE AND SODIUM CHLORIDE: 5; 450 INJECTION, SOLUTION INTRAVENOUS at 17:11

## 2018-12-15 RX ADMIN — LEVETIRACETAM 500 MG: 500 TABLET, FILM COATED ORAL at 20:51

## 2018-12-15 RX ADMIN — Medication 5 ML: at 17:05

## 2018-12-15 RX ADMIN — Medication 5 ML: at 20:51

## 2018-12-15 RX ADMIN — MIDODRINE HYDROCHLORIDE 10 MG: 5 TABLET ORAL at 17:05

## 2018-12-15 RX ADMIN — NYSTATIN 500000 UNITS: 500000 SUSPENSION ORAL at 20:53

## 2018-12-15 ASSESSMENT — PAIN SCALES - GENERAL
PAINLEVEL_OUTOF10: 0

## 2018-12-15 ASSESSMENT — PAIN DESCRIPTION - PAIN TYPE
TYPE: ACUTE PAIN;CHRONIC PAIN
TYPE: ACUTE PAIN;CHRONIC PAIN

## 2018-12-15 NOTE — PLAN OF CARE
Problem: Falls - Risk of:  Goal: Will remain free from falls  Will remain free from falls   Outcome: Met This Shift      Problem: Cardiac Output - Decreased:  Goal: Hemodynamic stability will improve  Hemodynamic stability will improve   Outcome: Met This Shift

## 2018-12-15 NOTE — PROGRESS NOTES
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     UTI (urinary tract infection)     V tach (HCC) 4/16     13 beats of nonsustained v tach       MEDS (scheduled):   magic (miracle) mouthwash  5 mL Swish & Swallow 4x daily    darbepoetin tj-polysorbate  100 mcg Subcutaneous Weekly    nystatin  5 mL Oral 4x Daily    pantoprazole  40 mg Oral QAM AC    levETIRAcetam  500 mg Oral BID    allopurinol  100 mg Oral Daily    atorvastatin  20 mg Oral Daily    levothyroxine  50 mcg Oral Daily    midodrine  10 mg Oral TID    sodium chloride flush  10 mL Intravenous 2 times per day       MEDS (infusions):   dextrose 5 % and 0.45 % NaCl 50 mL/hr at 12/14/18 1904       MEDS (prn):  acetaminophen, albuterol, benzocaine-menthol, sodium chloride flush, magnesium hydroxide, ondansetron    DIET:    DIET RENAL;      PHYSICAL EXAM:      Patient Vitals for the past 24 hrs:   BP Temp Temp src Pulse Resp SpO2 Weight   12/14/18 2306 (!) 114/53 98.2 °F (36.8 °C) Oral 86 16 95 % -   12/14/18 1734 (!) 104/56 - - 82 - - -   12/14/18 1701 (!) 99/58 - - 82 - - -   12/14/18 1632 (!) 101/58 - - 82 - - -   12/14/18 1602 96/60 - - 84 - - -   12/14/18 1531 95/64 - - 83 - - -   12/14/18 1501 (!) 94/52 - - 85 - - -   12/14/18 1430 106/61 - - 83 - - -   12/14/18 1402 121/70 - - 81 - - -   12/14/18 1353 113/65 - - 83 - - -   12/14/18 1350 116/67 98.5 °F (36.9 °C) - 82 16 - 183 lb 3.2 oz (83.1 kg)          Intake/Output Summary (Last 24 hours) at 12/15/18 1046  Last data filed at 12/14/18 2220   Gross per 24 hour   Intake                0 ml   Output                0 ml   Net                0 ml       Wt Readings from

## 2018-12-15 NOTE — PROGRESS NOTES
gastroenteritis  Possible candidal pharyngitis/esophagitis    Plan:  GI consult for inability to swallow 2/2 pain  BCx 2/2 + CNS, repeat  - ID Consult appreciated   AC reversed- holding coumadin  NSurg Consult appreciated-- no full AC until documented resolution of bleeding (likely 2-4 wks)  Nystatin S and S  PPI  ST- no aspiration noted  Card Consult appreciated   Nephro Consult appreciated   Medications for other co morbidities cont as appropriate w dosage adjustments as necessary   PT/OT  DVT GI PPx  Feeling better  DC planning-- back to ECF soon    Electronically signed by Finn Carmen MD on 12/15/2018 at 12:48 PM

## 2018-12-16 LAB
ANION GAP SERPL CALCULATED.3IONS-SCNC: 11 MMOL/L (ref 7–16)
BUN BLDV-MCNC: 3 MG/DL (ref 8–23)
CALCIUM SERPL-MCNC: 7.8 MG/DL (ref 8.6–10.2)
CHLORIDE BLD-SCNC: 102 MMOL/L (ref 98–107)
CO2: 29 MMOL/L (ref 22–29)
CREAT SERPL-MCNC: 4 MG/DL (ref 0.7–1.2)
CULTURE, BLOOD 2: ABNORMAL
GFR AFRICAN AMERICAN: 17
GFR NON-AFRICAN AMERICAN: 17 ML/MIN/1.73
GLUCOSE BLD-MCNC: 88 MG/DL (ref 74–99)
HCT VFR BLD CALC: 25.9 % (ref 37–54)
HEMOGLOBIN: 7.8 G/DL (ref 12.5–16.5)
INR BLD: 1.3
MCH RBC QN AUTO: 30.8 PG (ref 26–35)
MCHC RBC AUTO-ENTMCNC: 30.1 % (ref 32–34.5)
MCV RBC AUTO: 102.4 FL (ref 80–99.9)
ORGANISM: ABNORMAL
ORGANISM: ABNORMAL
PDW BLD-RTO: 24.6 FL (ref 11.5–15)
PLATELET # BLD: 202 E9/L (ref 130–450)
PMV BLD AUTO: 10.5 FL (ref 7–12)
POTASSIUM SERPL-SCNC: 3.5 MMOL/L (ref 3.5–5)
PROTHROMBIN TIME: 14.3 SEC (ref 9.3–12.4)
RBC # BLD: 2.53 E12/L (ref 3.8–5.8)
SODIUM BLD-SCNC: 142 MMOL/L (ref 132–146)
WBC # BLD: 4.7 E9/L (ref 4.5–11.5)

## 2018-12-16 PROCEDURE — 85027 COMPLETE CBC AUTOMATED: CPT

## 2018-12-16 PROCEDURE — 36415 COLL VENOUS BLD VENIPUNCTURE: CPT

## 2018-12-16 PROCEDURE — 2060000000 HC ICU INTERMEDIATE R&B

## 2018-12-16 PROCEDURE — 6370000000 HC RX 637 (ALT 250 FOR IP): Performed by: NEUROLOGICAL SURGERY

## 2018-12-16 PROCEDURE — 85610 PROTHROMBIN TIME: CPT

## 2018-12-16 PROCEDURE — 6370000000 HC RX 637 (ALT 250 FOR IP): Performed by: INTERNAL MEDICINE

## 2018-12-16 PROCEDURE — 80048 BASIC METABOLIC PNL TOTAL CA: CPT

## 2018-12-16 RX ADMIN — ATORVASTATIN CALCIUM 20 MG: 20 TABLET, FILM COATED ORAL at 10:05

## 2018-12-16 RX ADMIN — LEVETIRACETAM 500 MG: 500 TABLET, FILM COATED ORAL at 20:32

## 2018-12-16 RX ADMIN — NYSTATIN 500000 UNITS: 500000 SUSPENSION ORAL at 10:05

## 2018-12-16 RX ADMIN — LEVETIRACETAM 500 MG: 500 TABLET, FILM COATED ORAL at 10:05

## 2018-12-16 RX ADMIN — MIDODRINE HYDROCHLORIDE 10 MG: 5 TABLET ORAL at 13:26

## 2018-12-16 RX ADMIN — Medication 5 ML: at 10:06

## 2018-12-16 RX ADMIN — ALLOPURINOL 100 MG: 100 TABLET ORAL at 10:05

## 2018-12-16 RX ADMIN — NYSTATIN 500000 UNITS: 500000 SUSPENSION ORAL at 13:26

## 2018-12-16 RX ADMIN — MIDODRINE HYDROCHLORIDE 10 MG: 5 TABLET ORAL at 10:05

## 2018-12-16 ASSESSMENT — PAIN DESCRIPTION - PAIN TYPE
TYPE: ACUTE PAIN;CHRONIC PAIN
TYPE: ACUTE PAIN;CHRONIC PAIN

## 2018-12-16 ASSESSMENT — PAIN SCALES - GENERAL
PAINLEVEL_OUTOF10: 0

## 2018-12-16 NOTE — PROGRESS NOTES
LABALBU 4.2 11/04/2010         MICROBIOLOGY:     Blood culture -           Culture Blood #1 [[id number]] (Abnormal) Collected: 12/10/18 1255   Order Status: Completed Specimen: Blood Updated: 12/13/18 1034     Blood Culture, Routine -- (A)     24 Hours- no growth   Gram stain performed from blood culture bottle media   Gram positive cocci in clusters      Organism Staphylococcus species (A)     Blood Culture, Routine Identification and sensitivity to follow   Narrative:     CALL  [facility]  H74 tel. ,  Previous panic on this admission - call not needed per SOP, 12/12/2018 09:56,  by [facility]   Culture Blood #2 [[id number]] (Abnormal) Collected: 12/10/18 1255   Order Status: Completed Specimen: Blood Updated: 12/13/18 1029     Culture, Blood 2 -- (A)     Gram stain performed from blood culture bottle media   Gram positive cocci in clusters   Coagulase negative Staph by PNA fish      Organism Staphylococcus species (A)     Culture, Blood 2 Identification and sensitivity to follow   Narrative:                 Radiology :     Chest X ray\    Marked improvement in the changes involving both bases since the  previous study Of October 2, 2018.        CT scan of head -       Impression:         Small 2 to 3 mm subdural hematoma along the left cerebral convexity  and falx without midline shift. Surveillance recommended. The findings  were telephoned to the ER physician.         IMPRESSION:      1. Staph auricularis bacteremia  - contamination- follow up blood cx on 12/12 negative          RECOMMENDATIONS:       1.  Watch off abx           11:29 AM      12/16/2018

## 2018-12-16 NOTE — PROGRESS NOTES
Subjective:  Feeling ok No CP, SOB, F, V, D, P   Sore throat improved    Objective:    BP (!) 104/54   Pulse 79   Temp 98.4 °F (36.9 °C) (Temporal)   Resp 16   Ht 6' 1\" (1.854 m)   Wt 183 lb 3.2 oz (83.1 kg)   SpO2 95%   BMI 24.17 kg/m²     24HR INTAKE/OUTPUT:      Intake/Output Summary (Last 24 hours) at 12/16/18 1214  Last data filed at 12/16/18 0036   Gross per 24 hour   Intake                0 ml   Output                0 ml   Net                0 ml     nad  Heart:  RRR, no murmurs, gallops, or rubs. Lungs:  CTA bilaterally, no wheeze, rales or rhonchi  Abd: bowel sounds present, nontender, nondistended, no masses  Extrem:  No clubbing, cyanosis, or edema    Most Recent Labs  Lab Results   Component Value Date    WBC 4.7 12/16/2018    HGB 7.8 (L) 12/16/2018    HCT 25.9 (L) 12/16/2018     12/16/2018     12/16/2018    K 3.5 12/16/2018     12/16/2018    CREATININE 4.0 (H) 12/16/2018    BUN 3 (L) 12/16/2018    CO2 29 12/16/2018    GLUCOSE 88 12/16/2018    ALT 7 12/11/2018    AST 14 12/11/2018    INR 1.3 12/16/2018    TSH 3.870 05/01/2016    LABA1C 5.1 01/11/2015    LABMICR 149.1 (H) 05/21/2017       CT Head WO Contrast   Final Result   Small 2 to 3 mm subdural hematoma along the left cerebral convexity   and falx without midline shift. Surveillance recommended. The findings   were telephoned to the ER physician. XR CHEST PORTABLE   Final Result         Marked improvement in the changes involving both bases since the   previous study Of October 2, 2018. Assessment    Principal Problem:    Syncope and collapse  Active Problems:    Subdural hematoma (HCC)    Pulmonary hypertension (HCC)    Chronic systolic congestive heart failure (HCC)    Paroxysmal atrial fibrillation (HCC)    End stage renal failure on dialysis [facility])    Dysphagia    Odynophagia  Resolved Problems:    * No resolved hospital problems.  *  dehydration 2/2 vomiting /diarrhea  Suspected viral

## 2018-12-17 VITALS
WEIGHT: 179.9 LBS | BODY MASS INDEX: 23.84 KG/M2 | HEART RATE: 75 BPM | HEIGHT: 73 IN | SYSTOLIC BLOOD PRESSURE: 110 MMHG | DIASTOLIC BLOOD PRESSURE: 55 MMHG | RESPIRATION RATE: 19 BRPM | OXYGEN SATURATION: 94 % | TEMPERATURE: 97.5 F

## 2018-12-17 LAB
ANION GAP SERPL CALCULATED.3IONS-SCNC: 12 MMOL/L (ref 7–16)
BLOOD CULTURE, ROUTINE: NORMAL
BUN BLDV-MCNC: 5 MG/DL (ref 8–23)
CALCIUM SERPL-MCNC: 7.8 MG/DL (ref 8.6–10.2)
CHLORIDE BLD-SCNC: 101 MMOL/L (ref 98–107)
CO2: 27 MMOL/L (ref 22–29)
CREAT SERPL-MCNC: 5.4 MG/DL (ref 0.7–1.2)
GFR AFRICAN AMERICAN: 12
GFR NON-AFRICAN AMERICAN: 12 ML/MIN/1.73
GLUCOSE BLD-MCNC: 88 MG/DL (ref 74–99)
HCT VFR BLD CALC: 25.3 % (ref 37–54)
HEMOGLOBIN: 7.9 G/DL (ref 12.5–16.5)
INR BLD: 1.3
MCH RBC QN AUTO: 31.6 PG (ref 26–35)
MCHC RBC AUTO-ENTMCNC: 31.2 % (ref 32–34.5)
MCV RBC AUTO: 101.2 FL (ref 80–99.9)
PDW BLD-RTO: 25.1 FL (ref 11.5–15)
PLATELET # BLD: 209 E9/L (ref 130–450)
PMV BLD AUTO: 10.5 FL (ref 7–12)
POTASSIUM SERPL-SCNC: 3.6 MMOL/L (ref 3.5–5)
PROTHROMBIN TIME: 14.7 SEC (ref 9.3–12.4)
RBC # BLD: 2.5 E12/L (ref 3.8–5.8)
SODIUM BLD-SCNC: 140 MMOL/L (ref 132–146)
WBC # BLD: 4.5 E9/L (ref 4.5–11.5)

## 2018-12-17 PROCEDURE — 85027 COMPLETE CBC AUTOMATED: CPT

## 2018-12-17 PROCEDURE — 5A1D70Z PERFORMANCE OF URINARY FILTRATION, INTERMITTENT, LESS THAN 6 HOURS PER DAY: ICD-10-PCS | Performed by: INTERNAL MEDICINE

## 2018-12-17 PROCEDURE — 80048 BASIC METABOLIC PNL TOTAL CA: CPT

## 2018-12-17 PROCEDURE — 36415 COLL VENOUS BLD VENIPUNCTURE: CPT

## 2018-12-17 PROCEDURE — 6370000000 HC RX 637 (ALT 250 FOR IP): Performed by: INTERNAL MEDICINE

## 2018-12-17 PROCEDURE — 90935 HEMODIALYSIS ONE EVALUATION: CPT | Performed by: INTERNAL MEDICINE

## 2018-12-17 PROCEDURE — 85610 PROTHROMBIN TIME: CPT

## 2018-12-17 RX ORDER — LEVETIRACETAM 500 MG/1
500 TABLET ORAL 2 TIMES DAILY
Qty: 60 TABLET | Refills: 3 | DISCHARGE
Start: 2018-12-17

## 2018-12-17 RX ADMIN — MIDODRINE HYDROCHLORIDE 10 MG: 5 TABLET ORAL at 12:52

## 2018-12-17 ASSESSMENT — PAIN SCALES - GENERAL
PAINLEVEL_OUTOF10: 0

## 2018-12-17 NOTE — PROGRESS NOTES
Department of Internal Medicine  Nephrology Attending Progress Note    SUBJECTIVE:  We are following this patient for end-stage renal failure . 12/10: The pt is an 81 yo male who was sent in from a nursing home for nausea,diarrhea and vomiting for several days. The nursing home reports two syncopal episodes. He is a dialysis pt who undergoes hd on Monday, wed, Friday. His labs show   Na 142, k 3, co2 30, bun 24, cr 6.9, ca 8.2, alb 2.8, wbc 6, hgb 9.9, plt 238. Ct of the head without contrast showed a small SDH. He has been getting cautious ivf in the er. Pt is a very poor historian. He doesn't remember if he hit his head. Denies fever, chills, melena, BRBPR, hematemesis. 12/11: sp hd yesterday. 12/12: refusing meds, denies nausea and vomiting today  12/13: lethargic, no nausea, vomiting, no sob  12/14: seen on hd, no complaints  12/15: lethargic - awakens w/ encouragement - no specific complaints - poor oral intake    12/16: much more alert today - weak - no complaints   12/17: Pt resting in bed in no distress. He denies complaints--no CP, SOB.  He is to have HD today prior to D/C       PROBLEM LIST:    Patient Active Problem List   Diagnosis    HTN (hypertension), benign    Anemia of chronic disease    Mixed hyperlipidemia    Pulmonary hypertension (HCC)    Mild tricuspid regurgitation    Chronic systolic congestive heart failure (HCC)    Paroxysmal atrial fibrillation (HCC)    End stage renal failure on dialysis ([address]    Subdural hematoma (HCC)    Syncope and collapse    Dysphagia    Odynophagia        PAST MEDICAL HISTORY:    Past Medical History:   Diagnosis Date    Anemia severe    Arthritis     Atrial fibrillation ([address]     New onset 4/16    Calcaneal spur of both feet     Cardiomyopathy     Cardiomyopathy ([address]     CHF (congestive heart failure) (HCC)     Chronic kidney disease     Combined systolic and diastolic heart failure ([address] 2/24/13 2/24/13- echocardiogram revealed

## 2018-12-17 NOTE — DISCHARGE INSTR - COC
Continuity of Care Form    Patient Name: Topher Franklin   :  1934  MRN:  [id number]    Admit date:  12/10/2018  Discharge date:  18    Code Status Order: Full Code   Advance Directives:   [facility] Documentation     Date/Time Healthcare Directive Type of Healthcare Directive Copy in [address] Agent's Name Healthcare Agent's Phone Number    12/10/18 1480  No, patient does not have an advance directive for healthcare treatment -- -- -- -- --          Admitting Physician:  Isidro Tsai MD  PCP: Donal Ellis MD    Discharging Nurse: Negin Metcalf.  [facility] SERVICES Unit/Room#: 8000/5502-U  Discharging Unit Phone Number: [phone]    Emergency Contact:   Extended Emergency Contact Information  Primary Emergency Contact: Elsa Sutton  Address: [address] [place] [address] Phone: [phone]  Relation: Other  Secondary Emergency Contact: Pricila Sutton  Address: [address] Phone: [phone]  Relation: Brother/Sister    Past Surgical History:  Past Surgical History:   Procedure Laterality Date    CARDIAC CATHETERIZATION  13    DIALYSIS FISTULA CREATION Left 2017    Left arm AV fistula creation/Dr. Mendoza    ECHO COMPL W DOP COLOR FLOW  2013         OTHER SURGICAL HISTORY  2018    Left arm fistula by DR Mendoza    UPPER GASTROINTESTINAL ENDOSCOPY  , 16, & 16    VENA CAVA FILTER PLACEMENT      d/t DVT       Immunization History:   Immunization History   Administered Date(s) Administered    Influenza, High Dose (Fluzone 65 yrs and older) 2017       Active Problems:  Patient Active Problem List   Diagnosis Code    HTN (hypertension), benign I10    Anemia of chronic disease D63.8    Mixed hyperlipidemia E78.2    Pulmonary hypertension ([address] I27.20    Mild tricuspid regurgitation I07.1    Chronic

## 2018-12-17 NOTE — FLOWSHEET NOTE
12/17/18 1726   Vital Signs   BP (!) 110/55   Temp 97.5 °F (36.4 °C)   Pulse 75   Weight 179 lb 14.3 oz (81.6 kg)   Weight Method Bed scale   Percent Weight Change -0.85   Pain Assessment   Pain Assessment 0-10   Pain Level 0   Post-Hemodialysis Assessment   Post-Treatment Procedures Blood returned; Access bleeding time < 10 minutes   Machine Disinfection Process Acid/Vinegar Clean;Heat Disinfect; Exterior Machine Disinfection   Rinseback Volume (ml) 300 ml   Total Liters Processed (l/min) 90.7 l/min   Dialyzer Clearance Lightly streaked   Duration of Treatment (minutes) 240 minutes   Heparin amount administered during treatment (units) 0 units   Hemodialysis Intake (ml) 300 ml   Hemodialysis Output (ml) 1080 ml   NET Removed (ml) 780 ml   Patient Response to Treatment tolerated well, 780cc fluid removal   Bilateral Breath Sounds Diminished   Edema Right lower extremity; Left lower extremity   Physician Notified?  No